# Patient Record
Sex: MALE | Race: WHITE | NOT HISPANIC OR LATINO | Employment: FULL TIME | URBAN - METROPOLITAN AREA
[De-identification: names, ages, dates, MRNs, and addresses within clinical notes are randomized per-mention and may not be internally consistent; named-entity substitution may affect disease eponyms.]

---

## 2021-12-28 ENCOUNTER — APPOINTMENT (EMERGENCY)
Dept: VASCULAR ULTRASOUND | Facility: HOSPITAL | Age: 31
End: 2021-12-28

## 2021-12-28 ENCOUNTER — HOSPITAL ENCOUNTER (EMERGENCY)
Facility: HOSPITAL | Age: 31
Discharge: HOME | End: 2021-12-28
Attending: EMERGENCY MEDICINE

## 2021-12-28 VITALS
TEMPERATURE: 98.7 F | DIASTOLIC BLOOD PRESSURE: 82 MMHG | OXYGEN SATURATION: 99 % | RESPIRATION RATE: 18 BRPM | HEART RATE: 93 BPM | SYSTOLIC BLOOD PRESSURE: 138 MMHG

## 2021-12-28 DIAGNOSIS — I82.402 ACUTE DEEP VEIN THROMBOSIS (DVT) OF LEFT LOWER EXTREMITY (HCC): Primary | ICD-10-CM

## 2021-12-28 LAB
ALBUMIN SERPL BCP-MCNC: 3.7 G/DL (ref 3.5–5)
ALP SERPL-CCNC: 89 U/L (ref 46–116)
ALT SERPL W P-5'-P-CCNC: 33 U/L (ref 12–78)
ANION GAP SERPL CALCULATED.3IONS-SCNC: 8 MMOL/L (ref 4–13)
APTT PPP: 28 SECONDS (ref 23–37)
AST SERPL W P-5'-P-CCNC: 17 U/L (ref 5–45)
BASOPHILS # BLD AUTO: 0.03 THOUSANDS/ΜL (ref 0–0.1)
BASOPHILS NFR BLD AUTO: 1 % (ref 0–1)
BILIRUB SERPL-MCNC: 0.35 MG/DL (ref 0.2–1)
BUN SERPL-MCNC: 14 MG/DL (ref 5–25)
CALCIUM SERPL-MCNC: 8.5 MG/DL (ref 8.3–10.1)
CHLORIDE SERPL-SCNC: 105 MMOL/L (ref 100–108)
CO2 SERPL-SCNC: 28 MMOL/L (ref 21–32)
CREAT SERPL-MCNC: 0.87 MG/DL (ref 0.6–1.3)
EOSINOPHIL # BLD AUTO: 0.19 THOUSAND/ΜL (ref 0–0.61)
EOSINOPHIL NFR BLD AUTO: 4 % (ref 0–6)
ERYTHROCYTE [DISTWIDTH] IN BLOOD BY AUTOMATED COUNT: 12.3 % (ref 11.6–15.1)
GFR SERPL CREATININE-BSD FRML MDRD: 116 ML/MIN/1.73SQ M
GLUCOSE SERPL-MCNC: 99 MG/DL (ref 65–140)
HCT VFR BLD AUTO: 45.8 % (ref 36.5–49.3)
HGB BLD-MCNC: 15.6 G/DL (ref 12–17)
IMM GRANULOCYTES # BLD AUTO: 0.01 THOUSAND/UL (ref 0–0.2)
IMM GRANULOCYTES NFR BLD AUTO: 0 % (ref 0–2)
INR PPP: 0.98 (ref 0.84–1.19)
LYMPHOCYTES # BLD AUTO: 2.18 THOUSANDS/ΜL (ref 0.6–4.47)
LYMPHOCYTES NFR BLD AUTO: 41 % (ref 14–44)
MCH RBC QN AUTO: 30.1 PG (ref 26.8–34.3)
MCHC RBC AUTO-ENTMCNC: 34.1 G/DL (ref 31.4–37.4)
MCV RBC AUTO: 88 FL (ref 82–98)
MONOCYTES # BLD AUTO: 0.46 THOUSAND/ΜL (ref 0.17–1.22)
MONOCYTES NFR BLD AUTO: 9 % (ref 4–12)
NEUTROPHILS # BLD AUTO: 2.45 THOUSANDS/ΜL (ref 1.85–7.62)
NEUTS SEG NFR BLD AUTO: 45 % (ref 43–75)
NRBC BLD AUTO-RTO: 0 /100 WBCS
PLATELET # BLD AUTO: 279 THOUSANDS/UL (ref 149–390)
PMV BLD AUTO: 9.7 FL (ref 8.9–12.7)
POTASSIUM SERPL-SCNC: 3.7 MMOL/L (ref 3.5–5.3)
PROT SERPL-MCNC: 8.2 G/DL (ref 6.4–8.2)
PROTHROMBIN TIME: 13 SECONDS (ref 11.6–14.5)
RBC # BLD AUTO: 5.18 MILLION/UL (ref 3.88–5.62)
SODIUM SERPL-SCNC: 141 MMOL/L (ref 136–145)
WBC # BLD AUTO: 5.32 THOUSAND/UL (ref 4.31–10.16)

## 2021-12-28 PROCEDURE — 99284 EMERGENCY DEPT VISIT MOD MDM: CPT

## 2021-12-28 PROCEDURE — 36415 COLL VENOUS BLD VENIPUNCTURE: CPT | Performed by: EMERGENCY MEDICINE

## 2021-12-28 PROCEDURE — 85610 PROTHROMBIN TIME: CPT | Performed by: EMERGENCY MEDICINE

## 2021-12-28 PROCEDURE — 80053 COMPREHEN METABOLIC PANEL: CPT | Performed by: EMERGENCY MEDICINE

## 2021-12-28 PROCEDURE — 99284 EMERGENCY DEPT VISIT MOD MDM: CPT | Performed by: EMERGENCY MEDICINE

## 2021-12-28 PROCEDURE — 85025 COMPLETE CBC W/AUTO DIFF WBC: CPT | Performed by: EMERGENCY MEDICINE

## 2021-12-28 PROCEDURE — 85730 THROMBOPLASTIN TIME PARTIAL: CPT | Performed by: EMERGENCY MEDICINE

## 2021-12-28 PROCEDURE — 93971 EXTREMITY STUDY: CPT

## 2021-12-28 RX ADMIN — APIXABAN 10 MG: 5 TABLET, FILM COATED ORAL at 17:28

## 2021-12-28 NOTE — ED PROVIDER NOTES
History  Chief Complaint   Patient presents with    Leg Pain     Pt complains of left leg pain that started a few days ago  Pt states that its lower calf area  History provided by:  Patient   used: No    Leg Pain  Associated symptoms: no fever and no neck pain      Patient is a 68-year-old male presenting to emergency department with left leg pain that started a week ago  History of blood clot  Not on blood thinners currently  Feels similar  No chest pain or shortness of breath  No trauma  No recent surgeries  No recent hospital stays  MDM will get DVT ultrasound          None       Past Medical History:   Diagnosis Date    H/O blood clots        Past Surgical History:   Procedure Laterality Date    TONSILLECTOMY         History reviewed  No pertinent family history  I have reviewed and agree with the history as documented  E-Cigarette/Vaping    E-Cigarette Use Never User      E-Cigarette/Vaping Substances     Social History     Tobacco Use    Smoking status: Never Smoker    Smokeless tobacco: Never Used   Vaping Use    Vaping Use: Never used   Substance Use Topics    Alcohol use: Never    Drug use: Never       Review of Systems   Constitutional: Negative for chills, diaphoresis and fever  HENT: Negative for congestion and sore throat  Eyes: Negative for photophobia and visual disturbance  Respiratory: Negative for cough, shortness of breath, wheezing and stridor  Cardiovascular: Negative for chest pain, palpitations and leg swelling  Gastrointestinal: Negative for abdominal pain, blood in stool, diarrhea, nausea and vomiting  Genitourinary: Negative for dysuria, frequency and urgency  Musculoskeletal: Negative for neck pain and neck stiffness  Skin: Negative for pallor and rash  Neurological: Negative for dizziness, syncope, weakness, light-headedness and headaches  All other systems reviewed and are negative        Physical Exam  Physical Exam  Vitals reviewed  Constitutional:       Appearance: Normal appearance  He is well-developed  HENT:      Head: Normocephalic and atraumatic  Eyes:      Extraocular Movements: Extraocular movements intact  Pupils: Pupils are equal, round, and reactive to light  Cardiovascular:      Rate and Rhythm: Normal rate and regular rhythm  Heart sounds: Normal heart sounds  Pulmonary:      Effort: Pulmonary effort is normal  No respiratory distress  Breath sounds: Normal breath sounds  Abdominal:      General: Bowel sounds are normal       Palpations: Abdomen is soft  Tenderness: There is no abdominal tenderness  Musculoskeletal:         General: Tenderness present  No swelling  Normal range of motion  Cervical back: Normal range of motion and neck supple  Comments: Left calf tenderness   Skin:     General: Skin is warm and dry  Capillary Refill: Capillary refill takes less than 2 seconds  Neurological:      General: No focal deficit present  Mental Status: He is alert and oriented to person, place, and time           Vital Signs  ED Triage Vitals   Temperature Pulse Respirations Blood Pressure SpO2   12/28/21 1419 12/28/21 1418 12/28/21 1418 12/28/21 1418 12/28/21 1418   98 7 °F (37 1 °C) 93 18 138/82 99 %      Temp Source Heart Rate Source Patient Position - Orthostatic VS BP Location FiO2 (%)   12/28/21 1419 12/28/21 1418 -- 12/28/21 1418 --   Oral Monitor  Left arm       Pain Score       --                  Vitals:    12/28/21 1418   BP: 138/82   Pulse: 93         Visual Acuity      ED Medications  Medications   apixaban (ELIQUIS) tablet 10 mg (10 mg Oral Given 12/28/21 1728)       Diagnostic Studies  Results Reviewed     Procedure Component Value Units Date/Time    Comprehensive metabolic panel [[id number]] Collected: 12/28/21 1617    Lab Status: Final result Specimen: Blood from Arm, Right Updated: 12/28/21 1710     Sodium 141 mmol/L      Potassium 3 7 mmol/L Chloride 105 mmol/L      CO2 28 mmol/L      ANION GAP 8 mmol/L      BUN 14 mg/dL      Creatinine 0 87 mg/dL      Glucose 99 mg/dL      Calcium 8 5 mg/dL      AST 17 U/L      ALT 33 U/L      Alkaline Phosphatase 89 U/L      Total Protein 8 2 g/dL      Albumin 3 7 g/dL      Total Bilirubin 0 35 mg/dL      eGFR 116 ml/min/1 73sq m     Narrative:      National Kidney Disease Foundation guidelines for Chronic Kidney Disease (CKD):     Stage 1 with normal or high GFR (GFR > 90 mL/min/1 73 square meters)    Stage 2 Mild CKD (GFR = 60-89 mL/min/1 73 square meters)    Stage 3A Moderate CKD (GFR = 45-59 mL/min/1 73 square meters)    Stage 3B Moderate CKD (GFR = 30-44 mL/min/1 73 square meters)    Stage 4 Severe CKD (GFR = 15-29 mL/min/1 73 square meters)    Stage 5 End Stage CKD (GFR <15 mL/min/1 73 square meters)  Note: GFR calculation is accurate only with a steady state creatinine    Protime-INR [[id number]]  (Normal) Collected: 12/28/21 1617    Lab Status: Final result Specimen: Blood from Arm, Right Updated: 12/28/21 1642     Protime 13 0 seconds      INR 0 98    APTT [[id number]]  (Normal) Collected: 12/28/21 1617    Lab Status: Final result Specimen: Blood from Arm, Right Updated: 12/28/21 1642     PTT 28 seconds     CBC and differential [[id number]] Collected: 12/28/21 1617    Lab Status: Final result Specimen: Blood from Arm, Right Updated: 12/28/21 1625     WBC 5 32 Thousand/uL      RBC 5 18 Million/uL      Hemoglobin 15 6 g/dL      Hematocrit 45 8 %      MCV 88 fL      MCH 30 1 pg      MCHC 34 1 g/dL      RDW 12 3 %      MPV 9 7 fL      Platelets 033 Thousands/uL      nRBC 0 /100 WBCs      Neutrophils Relative 45 %      Immat GRANS % 0 %      Lymphocytes Relative 41 %      Monocytes Relative 9 %      Eosinophils Relative 4 %      Basophils Relative 1 %      Neutrophils Absolute 2 45 Thousands/µL      Immature Grans Absolute 0 01 Thousand/uL      Lymphocytes Absolute 2 18 Thousands/µL      Monocytes Absolute 0 46 Thousand/µL      Eosinophils Absolute 0 19 Thousand/µL      Basophils Absolute 0 03 Thousands/µL                  VAS lower limb venous duplex study, unilateral/limited    (Results Pending)              Procedures  Procedures         ED Course  ED Course as of 12/28/21 1830   Tue Dec 28, 2021   1610 Pt with acute dvt left posterior tibial veins and soleal vein                                             MDM     Discussed with patient starting on a blood thinner  He is agreeable  Patient understands he is to follow up with outpatient, understands the risks associated with blood thinner  Disposition  Final diagnoses:   Acute deep vein thrombosis (DVT) of left lower extremity ([address] )     Time reflects when diagnosis was documented in both MDM as applicable and the Disposition within this note     Time User Action Codes Description Comment    12/28/2021  5:14 PM Romelia Ronquillo [I82 [id number]] Acute deep vein thrombosis (DVT) of left lower extremity [facility])       ED Disposition     ED Disposition Condition Date/Time Comment    Discharge Stable Tue Dec 28, 2021  5:14 PM Dia Hammond discharge to home/self care              Follow-up Information     Follow up With Specialties Details Why Contact Info Additional Information    [facility] Emergency Department Emergency Medicine  As needed, If symptoms worsen [address] [facility] [address] [facility] Emergency Department, [address], [place], [place], [address] [facility]  Family Medicine Schedule an appointment as soon as possible for a visit  Please follow-up regarding your treatment for DVT [address] [id number]  [address]  , [place], [place], [address]          Discharge Medication List as of 12/28/2021  5:16 PM      START taking these medications    Details apixaban (Eliquis) 5 mg Multiple Dosages:Starting Tue 12/28/2021, Until Mon 1/3/2022 at 2359, THEN Starting Tue 1/4/2022, Until Wed 1/26/2022 at 2359Take 2 tablets (10 mg total) by mouth 2 (two) times a day for 7 days, THEN 1 tablet (5 mg total) 2 (two) times a day for 21 da ys , Print             No discharge procedures on file      PDMP Review     None          ED Provider  Electronically Signed by           Maxine Baez MD  12/28/21 5495

## 2021-12-28 NOTE — DISCHARGE INSTRUCTIONS
Please make sure you are taking your blood thinner and follow up with family doctor  You will need treatment for 3-6 months    Return to ER if having chest pain, shortness of breath, bleeding, lightheaded or feel worse overall

## 2021-12-29 PROCEDURE — 93971 EXTREMITY STUDY: CPT | Performed by: SURGERY

## 2022-01-03 ENCOUNTER — OFFICE VISIT (OUTPATIENT)
Dept: FAMILY MEDICINE CLINIC | Facility: CLINIC | Age: 32
End: 2022-01-03

## 2022-01-03 VITALS
TEMPERATURE: 98.2 F | HEART RATE: 86 BPM | DIASTOLIC BLOOD PRESSURE: 80 MMHG | OXYGEN SATURATION: 99 % | WEIGHT: 249 LBS | SYSTOLIC BLOOD PRESSURE: 120 MMHG

## 2022-01-03 DIAGNOSIS — Z13.220 SCREENING FOR LIPID DISORDERS: ICD-10-CM

## 2022-01-03 DIAGNOSIS — Z11.4 ENCOUNTER FOR SCREENING FOR HUMAN IMMUNODEFICIENCY VIRUS (HIV): ICD-10-CM

## 2022-01-03 DIAGNOSIS — I82.442 ACUTE DEEP VEIN THROMBOSIS (DVT) OF TIBIAL VEIN OF LEFT LOWER EXTREMITY (HCC): ICD-10-CM

## 2022-01-03 DIAGNOSIS — R53.1 WEAKNESS: ICD-10-CM

## 2022-01-03 DIAGNOSIS — Z11.59 ENCOUNTER FOR HEPATITIS C SCREENING TEST FOR LOW RISK PATIENT: Primary | ICD-10-CM

## 2022-01-03 PROBLEM — Z00.00 HEALTHCARE MAINTENANCE: Status: ACTIVE | Noted: 2022-01-03

## 2022-01-03 PROCEDURE — 99203 OFFICE O/P NEW LOW 30 MIN: CPT | Performed by: FAMILY MEDICINE

## 2022-01-03 NOTE — PROGRESS NOTES
Assessment & Plan     Acute deep vein thrombosis (DVT) of tibial vein of left lower extremity ([address] )   Presented to the ED on 12/28/21 with acute left leg pain - found to have acute dvt left posterior tibial veins and soleal vein  Assessment: Still having some discomfort in the LLE, likely clot still present and resolving  Not interested in medications at this time  No known reason for provoked DVT at this time  No FHx of clots, no personal h/o cancer, no prolonged immobilization, no known trauma  Likely will need lifelong anticoagulation  No evidence of bleeding but still feels weak since DVT found  Consider anemia vs deconditioning  No neurological signs of weakness on exam     PLAN  · Continue eliquis - to start 5mg BID tomorrow  · In 6 months, obtain thrombosis panel and repeat dopplers to assess for DVT  · Obtain CBC to assess for bleeding   · Gave ED precautions - worsening leg pain, SOB      Healthcare maintenance  PLAN  · Due for annual physical, depression screening - perform at 4 weeks follow up  · Lipid panel ordered  · Discussed and ordered HIV and Hep C - patient agreed to testing  · Discuss immunization recommendations at future visit         Diagnoses and all orders for this visit:    Encounter for hepatitis C screening test for low risk patient  -     Hepatitis C antibody; Future    Encounter for screening for human immunodeficiency virus (HIV)  -     HIV 1/2 ANTIGEN/ANTIBODY (4TH GENERATION) W REFLEX [facility]; Future    Screening for lipid disorders  -     Lipid panel; Future    Acute deep vein thrombosis (DVT) of tibial vein of left lower extremity (HCC)  -     Thrombosis Panel;  Future  -     VAS lower limb venous duplex study, complete bilateral; Future    Weakness  -     CBC and differential; Future               Physical Activity Assessment and Intervention:    Activity journal reviewed      Other interventions: Does not workout    Tobacco and Toxic Substance Assessment and Intervention: Tobacco use screening performed    Alcohol and drug use screening performed        Return in about 4 weeks (around 1/31/2022) for Annual physical       History of Present Illness     Chief Complaint   Patient presents with   Linda is a 36 y o  male who presents today to [facility] care  Reports he had a blood clot recently - seen on 12/28 in the ED as he had pain in LLE for 8-9 days  Pain was insidious and gradually worsened which brought him to the ED  Same situation happened 5-6 years ago  He was hospitalized for 2-3 days  Also placed on eliquis  Unsure if he was ever worked up  Unsure when he stopped taking it  Denies family history of blood clots  Denies recent trauma  Denies any personal history of cancer  Denies easy bleeding or bruising  Denies prolonged travel  Only drives 10 minutes to and from work every day  The furthest he travels is 1 hour to see his mom  Not sedentary - works at [employer]  Family history  Denies family history of cancer  Denies family history of clots  Social history  Denies drinking or smoking  Denies IV drug use  He doesn't workout  , has 1 child - daughter, 3years old  Review of Systems     Review of Systems   Constitutional: Negative for chills and fever  HENT: Negative for congestion and rhinorrhea  Eyes: Negative for visual disturbance  Respiratory: Positive for shortness of breath  Gastrointestinal: Negative for blood in stool, diarrhea, nausea and vomiting  Genitourinary: Negative for difficulty urinating and hematuria  Musculoskeletal: Positive for myalgias (in L calf)  Neurological: Positive for weakness and headaches  Negative for light-headedness and numbness  Psychiatric/Behavioral: Negative for confusion           The following portions of the patient's history were reviewed and updated as appropriate: allergies, current medications, past family history, past medical history, past social history, past surgical history and problem list     Objective     /80   Pulse 86   Temp 98 2 °F (36 8 °C)   Wt 113 kg (249 lb)   SpO2 99%        Physical Exam  Vitals and nursing note reviewed  Constitutional:       General: He is not in acute distress  Appearance: Normal appearance  He is well-developed  He is not ill-appearing, toxic-appearing or diaphoretic  HENT:      Head: Normocephalic and atraumatic  Eyes:      General: No scleral icterus  Right eye: No discharge  Left eye: No discharge  Conjunctiva/sclera: Conjunctivae normal    Neck:      Thyroid: No thyromegaly  Cardiovascular:      Rate and Rhythm: Normal rate and regular rhythm  Heart sounds: Normal heart sounds  No murmur heard  No friction rub  No gallop  Pulmonary:      Effort: Pulmonary effort is normal  No respiratory distress  Breath sounds: Normal breath sounds  No stridor  No wheezing or rales  Abdominal:      General: Bowel sounds are normal  There is no distension  Palpations: Abdomen is soft  Tenderness: There is no abdominal tenderness  Musculoskeletal:         General: Tenderness (L calf tenderness to palpation and with dorsiflexion of the L foot) present  Cervical back: Normal range of motion and neck supple  Right lower leg: No edema  Left lower leg: No edema  Skin:     General: Skin is warm and dry  Capillary Refill: Capillary refill takes less than 2 seconds  Coloration: Skin is not pale  Neurological:      Mental Status: He is alert        Comments: 5/5 upper and lower extremity strength   Psychiatric:         Mood and Affect: Mood normal          Behavior: Behavior normal            Signature:   Florentin Dobson 162, PGY-3  01/03/22

## 2022-01-03 NOTE — LETTER
January 3, 2022     Patient: Lisa Robison   YOB: 1990   Date of Visit: 1/3/2022       To Whom it May Concern:    Lisa Robison is under my professional care  He was seen in my office on 1/3/2022  He may return to work with limitations - please limit working to one floor (minimize use of stairs), and allow for intermittent brief breaks  If you have any questions or concerns, please don't hesitate to call           Sincerely,          Tory Counter, DO        CC: No Recipients

## 2022-01-03 NOTE — ASSESSMENT & PLAN NOTE
 Presented to the ED on 12/28/21 with acute left leg pain - found to have acute dvt left posterior tibial veins and soleal vein  Assessment: Still having some discomfort in the LLE, likely clot still present and resolving  Not interested in medications at this time  No known reason for provoked DVT at this time  No FHx of clots, no personal h/o cancer, no prolonged immobilization, no known trauma  Likely will need lifelong anticoagulation  No evidence of bleeding but still feels weak since DVT found  Consider anemia vs deconditioning   No neurological signs of weakness on exam     PLAN  · Continue eliquis - to start 5mg BID tomorrow  · In 6 months, obtain thrombosis panel and repeat dopplers to assess for DVT  · Obtain CBC to assess for bleeding   · Gave ED precautions - worsening leg pain, SOB

## 2022-01-03 NOTE — ASSESSMENT & PLAN NOTE
PLAN  · Due for annual physical, depression screening - perform at 4 weeks follow up  · Lipid panel ordered  · Discussed and ordered HIV and Hep C - patient agreed to testing  · Discuss immunization recommendations at future visit

## 2022-01-03 NOTE — PATIENT INSTRUCTIONS
-Get labs prior to next visit  Please make sure you are fasting 12 hours for this test as it can alter results  It is easiest to not eat after dinner, and get labs done first thing in the morning   We are testing your cholesterol and testing you for HIV and Hep C    -In 6 months (July 2022), please get thrombosis panel and imaging of your legs to check again for clots    -Please find out if there is any family history of cancer

## 2022-01-31 ENCOUNTER — OFFICE VISIT (OUTPATIENT)
Dept: FAMILY MEDICINE CLINIC | Facility: CLINIC | Age: 32
End: 2022-01-31

## 2022-01-31 VITALS
WEIGHT: 251 LBS | TEMPERATURE: 99.9 F | OXYGEN SATURATION: 99 % | HEIGHT: 73 IN | BODY MASS INDEX: 33.27 KG/M2 | HEART RATE: 76 BPM | DIASTOLIC BLOOD PRESSURE: 84 MMHG | SYSTOLIC BLOOD PRESSURE: 120 MMHG

## 2022-01-31 DIAGNOSIS — I82.402 ACUTE DEEP VEIN THROMBOSIS (DVT) OF LEFT LOWER EXTREMITY (HCC): ICD-10-CM

## 2022-01-31 DIAGNOSIS — Z00.00 ANNUAL PHYSICAL EXAM: Primary | ICD-10-CM

## 2022-01-31 DIAGNOSIS — E66.09 CLASS 1 OBESITY DUE TO EXCESS CALORIES WITHOUT SERIOUS COMORBIDITY WITH BODY MASS INDEX (BMI) OF 33.0 TO 33.9 IN ADULT: ICD-10-CM

## 2022-01-31 PROCEDURE — 99395 PREV VISIT EST AGE 18-39: CPT | Performed by: FAMILY MEDICINE

## 2022-01-31 NOTE — PROGRESS NOTES
[address] [facility]    NAME: Nova Brannon  AGE: 36 y o  SEX: male  : 1990     DATE: 2022     Assessment and Plan:     Problem List Items Addressed This Visit        Unprioritized    Class 1 obesity due to excess calories without serious comorbidity with body mass index (BMI) of 33 0 to 33 9 in adult      Other Visit Diagnoses     Annual physical exam    -  Primary    Acute deep vein thrombosis (DVT) of left lower extremity (HCC)        Relevant Medications    apixaban (Eliquis) 5 mg          Immunizations and preventive care screenings were discussed with patient today  Appropriate education was printed on patient's after visit summary  Counseling:  Dental Health: discussed importance of regular tooth brushing, flossing, and dental visits  Sexual health: discussed sexually transmitted diseases, partner selection, use of condoms, avoidance of unintended pregnancy, and contraceptive alternatives  · Exercise: the importance of regular exercise/physical activity was discussed  Recommend exercise 3-5 times per week for at least 30 minutes  BMI Counseling: Body mass index is 33 12 kg/m²  The BMI is above normal  Nutrition recommendations include consuming healthier snacks, limiting drinks that contain sugar and reducing intake of saturated and trans fat  Exercise recommendations include moderate physical activity 150 minutes/week and exercising 3-5 times per week  Rationale for BMI follow-up plan is due to patient being overweight or obese  Refused referral to nutrition or fitness  Very stubborn, which he admits and is aware of  will try to increase fruit and vegetable intake; add 1/2 cup beans daily - but he is not sure if he is willing to make change or try to consume healthier foods  He also does not want to start working out yet because it is too cold outside and he does not want to join a gym         Depression Screening and Follow-up Plan: Patient was screened for depression during today's encounter  They screened negative with a PHQ-2 score of 0  Nutrition Assessment and Intervention:     Reviewed food recall journal      Other interventions: Handout provided on increasing fiber intake    Physical Activity Assessment and Intervention:    Activity journal reviewed      Emotional and Mental Well-being, Sleep, Connectedness Assessment and Intervention:    PHQ-9 or GAD7 performed for initial evaluation or follow-up      Tobacco and Toxic Substance Assessment and Intervention:     Tobacco use screening performed    Alcohol and drug use screening performed      Therapeutic Lifestyle Change Visit:     One-on-one comprehensive counseling, coaching, and health behavior change visit completed        Return in about 6 weeks (around 3/14/2022) for Follow up Mayo Chandra ; address lifestyle changes  Chief Complaint:     Chief Complaint   Patient presents with    Physical Exam      History of Present Illness:     Adult Annual Physical   Patient here for a comprehensive physical exam  The patient reports no problems  Diet and Physical Activity  · Diet/Nutrition: poor diet, frequent junk food and limited fruits/vegetables  · Breakfast: fruity pebble chocolate cereal with milk  · Lunch: Mac and cheese, orange, granola bar, water, soda    · Breakfast: scrambled eggs with ham  · Lunch: grilled cheese  · Dinner: rice and beans, lasagna    · Limited fast food intake  · Junk food: Ice cream bar, oatmeal cream pie, candy bars     · Exercise: no formal exercise  Depression Screening  PHQ-2/9 Depression Screening    Little interest or pleasure in doing things: 0 - not at all  Feeling down, depressed, or hopeless: 0 - not at all  PHQ-2 Score: 0  PHQ-2 Interpretation: Negative depression screen       General Health  · Sleep: sleeps well, gets 4-6 hours of sleep on average, snores loudly and witnessed apnea   Reports he was tested for LASHELL when he was [age]years old, but it was negative then  Not snoring every day  · Hearing: normal - bilateral   · Vision: vision problems: needs a new appointment, wears glasses  · Dental: regular dental visits, brushes teeth twice daily and flosses teeth occasionally  Needs to schedule     Health  · History of STDs?: no     Tobacco/alcohol/drug:  Denies tobacco, alcohol or drug use     Review of Systems:     Review of Systems   Constitutional: Negative for chills and fever  HENT: Negative for congestion and sore throat  Eyes: Positive for visual disturbance (has glasses)  Negative for pain  Respiratory: Negative for cough and shortness of breath  Cardiovascular: Negative for chest pain and leg swelling  Gastrointestinal: Negative for abdominal pain, constipation, diarrhea, nausea and vomiting  Endocrine: Negative for polydipsia and polyuria  Genitourinary: Negative for difficulty urinating and dysuria  Denies issues with erectile dysfunction     Skin: Negative for rash and wound  Neurological: Negative for light-headedness and headaches  Psychiatric/Behavioral: Positive for sleep disturbance (snores)        Past Medical History:     Past Medical History:   Diagnosis Date    H/O blood clots       Past Surgical History:     Past Surgical History:   Procedure Laterality Date    TONSILLECTOMY        Social History:     Social History     Socioeconomic History    Marital status: /Civil Union     Spouse name: Not on file    Number of children: Not on file    Years of education: Not on file    Highest education level: Not on file   Occupational History    Not on file   Tobacco Use    Smoking status: Never Smoker    Smokeless tobacco: Never Used   Vaping Use    Vaping Use: Never used   Substance and Sexual Activity    Alcohol use: Never    Drug use: Never    Sexual activity: Not on file   Other Topics Concern    Not on file   Social History Narrative    Not on file     Social Determinants of Health     Financial Resource Strain: Not on file   Food Insecurity: Not on file   Transportation Needs: Not on file   Physical Activity: Not on file   Stress: Not on file   Social Connections: Not on file   Intimate Partner Violence: Not on file   Housing Stability: Not on file      Family History:     No family history on file  Current Medications:     Current Outpatient Medications   Medication Sig Dispense Refill    apixaban (Eliquis) 5 mg Take 1 tablet (5 mg total) by mouth 2 (two) times a day 180 tablet 0     No current facility-administered medications for this visit  Allergies:     No Known Allergies   Physical Exam:     /84   Pulse 76   Temp 99 9 °F (37 7 °C)   Ht 6' 1" (1 854 m)   Wt 114 kg (251 lb)   SpO2 99%   BMI 33 12 kg/m²     Physical Exam  Vitals and nursing note reviewed  Constitutional:       General: He is not in acute distress  Appearance: Normal appearance  He is well-developed  He is not ill-appearing, toxic-appearing or diaphoretic  HENT:      Head: Normocephalic and atraumatic  Right Ear: External ear normal       Left Ear: External ear normal       Nose: Nose normal       Mouth/Throat:      Mouth: Mucous membranes are moist       Pharynx: Oropharynx is clear  No oropharyngeal exudate or posterior oropharyngeal erythema  Eyes:      General: No scleral icterus  Right eye: No discharge  Left eye: No discharge  Extraocular Movements: Extraocular movements intact  Conjunctiva/sclera: Conjunctivae normal       Pupils: Pupils are equal, round, and reactive to light  Neck:      Trachea: No tracheal deviation  Cardiovascular:      Rate and Rhythm: Normal rate and regular rhythm  Pulses: Normal pulses  Heart sounds: Normal heart sounds  No murmur heard  Comments: 2+ radial pulses bilaterally    Pulmonary:      Effort: Pulmonary effort is normal  No respiratory distress        Breath sounds: Normal breath sounds  No stridor  No wheezing or rales  Chest:      Chest wall: No tenderness  Abdominal:      General: Bowel sounds are normal  There is no distension  Palpations: Abdomen is soft  Tenderness: There is no abdominal tenderness  There is no guarding or rebound  Musculoskeletal:         General: No tenderness  Normal range of motion  Cervical back: Normal range of motion  Right lower leg: No edema  Left lower leg: No edema  Skin:     General: Skin is warm  Capillary Refill: Capillary refill takes less than 2 seconds  Neurological:      Mental Status: He is alert and oriented to person, place, and time     Psychiatric:         Mood and Affect: Mood normal          Behavior: Behavior normal           Gilda Plata DO   [facility]

## 2022-01-31 NOTE — PATIENT INSTRUCTIONS
-Eat 1/2 cup beans daily! -Try to increase fruit/vegetable intake to 3-5 servings  -Try to walk at least 10 minutes daily      Abdoul, let's talk about FIBER!! I know fiber can cause gas, bloating, cramping but fiber is such an excellent way to keep your intestines healthy, maintain regular bowel movements and reduce risks of colon diseases such as colon cancer  Many patients with obesity, hypertension, and diabetes use fiber as a way to feel full, lose weight, and keep bowel movements regular  It is VERY important you learn 2 things form reading this: 1  What foods have fiber and 2  How much fiber is actually needed in a regular healthy lifestyle  Now 25 grams or more fiber a day is the recommend amount for adults however, getting to 25gm fiber or higher must be done SLOWLY!!! Adding 5gm a fiber to your diet daily for 1-2 weeks then increase another 5gm is the BEST way to do this  If you go from 0 gms fiber to 25gm fiber in the same day, you will feel the gas, bloating, cramps and be uncomfortable  For kids they should have their age + 11  For example a 11year old needs about 10 grams/day, a 8year old needs about 15 grams/day)     Examples foods with fiber:  Raspberries (8gm) Guava (9gm) Figs (9gm)   Barley (6gm) Kiwi (4gm) Refried beans (6gm)   Beets (6gm) Chickpeas (5gm) Grapefruit (4gm)   Blackberries (8gm) Pear (6gm) Avocado (8gm)   Edamame (8gm) Black Beans (15gm) Quinoa (5gm)   Almonds (4gm) Apples (4gm) Brussel Sprouts (4gm)  Spinach      Benefiber is a great powder that dissolves in warm water, coffee, hot tea which can be added teaspoon a ta time to help add fiber to your diet, especially if some of these foods don't agree with you  You could also try Metamucil  Miralax works as both a fiber supplement and a stool softener  This can safely be used every day to prevent and treat constipation        Again, SLOWLY!!!! Add fiber slowly as you will feel side effecst of too much fiber too fast       Other examples include:  BREADS: Made with 100% whole wheat flour; valdez, wheat or rye crackers; tortillas, bran muffins   CEREALS: Whole grain cereal with bran (Chex, Raisin Bran, Corn Bran), oatmeal, rolled oats, granola, wheat flakes, brown rice   NUTS: Any nuts   FRUITS: All fresh fruits along with edible skins, (bananas, citrus fruit, mangoes, pears, prunes, raisins, apples, pineapple, apricot, melon, jams and marmalades), fruit juices (especially prune juice)   VEGETABLES: All types, preferably raw or lightly cooked: especially, celery, eggplant, potatoes,spinach, broccoli, brussel sprouts, winter squash, carrots, cauliflower, soybeans, lentils, fresh and dried beans of all kinds        Wellness Visit for Adults   AMBULATORY CARE:   A wellness visit  is when you see your healthcare provider to get screened for health problems  Your healthcare provider will also give you advice on how to stay healthy  Write down your questions so you remember to ask them  Ask your healthcare provider how often you should have a wellness visit  What happens at a wellness visit:  Your healthcare provider will ask about your health, and your family history of health problems  This includes high blood pressure, heart disease, and cancer  He or she will ask if you have symptoms that concern you, if you smoke, and about your mood  You may also be asked about your intake of medicines, supplements, food, and alcohol  Any of the following may be done:  · Your weight  will be checked  Your height may also be checked so your body mass index (BMI) can be calculated  Your BMI shows if you are at a healthy weight  · Your blood pressure  and heart rate will be checked  Your temperature may also be checked  · Blood and urine tests  may be done  Blood tests may be done to check your cholesterol levels  Abnormal cholesterol levels increase your risk for heart disease and stroke   You may also need a blood or urine test to check for diabetes if you are at increased risk  Urine tests may be done to look for signs of an infection or kidney disease  · A physical exam  includes checking your heartbeat and lungs with a stethoscope  Your healthcare provider may also check your skin to look for sun damage  · Screening tests  may be recommended  A screening test is done to check for diseases that may not cause symptoms  The screening tests you may need depend on your age, gender, family history, and lifestyle habits  For example, colorectal screening may be recommended if you are 48years old or older  Screening tests you need if you are a woman:   · A Pap smear  is used to screen for cervical cancer  Pap smears are usually done every 3 to 5 years depending on your age  You may need them more often if you have had abnormal Pap smear test results in the past  Ask your healthcare provider how often you should have a Pap smear  · A mammogram  is an x-ray of your breasts to screen for breast cancer  Experts recommend mammograms every 2 years starting at age 48 years  You may need a mammogram at age 52 years or younger if you have an increased risk for breast cancer  Talk to your healthcare provider about when you should start having mammograms and how often you need them  Vaccines you may need:   · Get an influenza vaccine  every year  The influenza vaccine protects you from the flu  Several types of viruses cause the flu  The viruses change over time, so new vaccines are made each year  · Get a tetanus-diphtheria (Td) booster vaccine  every 10 years  This vaccine protects you against tetanus and diphtheria  Tetanus is a severe infection that may cause painful muscle spasms and lockjaw  Diphtheria is a severe bacterial infection that causes a thick covering in the back of your mouth and throat  · Get a human papillomavirus (HPV) vaccine  if you are female and aged 23 to 32 or male 23 to 24 and never received it   This vaccine protects you from HPV infection  HPV is the most common infection spread by sexual contact  HPV may also cause vaginal, penile, and anal cancers  · Get a pneumococcal vaccine  if you are aged 72 years or older  The pneumococcal vaccine is an injection given to protect you from pneumococcal disease  Pneumococcal disease is an infection caused by pneumococcal bacteria  The infection may cause pneumonia, meningitis, or an ear infection  · Get a shingles vaccine  if you are 60 or older, even if you have had shingles before  The shingles vaccine is an injection to protect you from the varicella-zoster virus  This is the same virus that causes chickenpox  Shingles is a painful rash that develops in people who had chickenpox or have been exposed to the virus  How to eat healthy:  My Plate is a model for planning healthy meals  It shows the types and amounts of foods that should go on your plate  Fruits and vegetables make up about half of your plate, and grains and protein make up the other half  A serving of dairy is included on the side of your plate  The amount of calories and serving sizes you need depends on your age, gender, weight, and height  Examples of healthy foods are listed below:  · Eat a variety of vegetables  such as dark green, red, and orange vegetables  You can also include canned vegetables low in sodium (salt) and frozen vegetables without added butter or sauces  · Eat a variety of fresh fruits , canned fruit in 100% juice, frozen fruit, and dried fruit  · Include whole grains  At least half of the grains you eat should be whole grains  Examples include whole-wheat bread, wheat pasta, brown rice, and whole-grain cereals such as oatmeal     · Eat a variety of protein foods such as seafood (fish and shellfish), lean meat, and poultry without skin (turkey and chicken)   Examples of lean meats include pork leg, shoulder, or tenderloin, and beef round, sirloin, tenderloin, and extra lean ground beef  Other protein foods include eggs and egg substitutes, beans, peas, soy products, nuts, and seeds  · Choose low-fat dairy products such as skim or 1% milk or low-fat yogurt, cheese, and cottage cheese  · Limit unhealthy fats  such as butter, hard margarine, and shortening  Exercise:  Exercise at least 30 minutes per day on most days of the week  Some examples of exercise include walking, biking, dancing, and swimming  You can also fit in more physical activity by taking the stairs instead of the elevator or parking farther away from stores  Include muscle strengthening activities 2 days each week  Regular exercise provides many health benefits  It helps you manage your weight, and decreases your risk for type 2 diabetes, heart disease, stroke, and high blood pressure  Exercise can also help improve your mood  Ask your healthcare provider about the best exercise plan for you  General health and safety guidelines:   · Do not smoke  Nicotine and other chemicals in cigarettes and cigars can cause lung damage  Ask your healthcare provider for information if you currently smoke and need help to quit  E-cigarettes or smokeless tobacco still contain nicotine  Talk to your healthcare provider before you use these products  · Limit alcohol  A drink of alcohol is 12 ounces of beer, 5 ounces of wine, or 1½ ounces of liquor  · Lose weight, if needed  Being overweight increases your risk of certain health conditions  These include heart disease, high blood pressure, type 2 diabetes, and certain types of cancer  · Protect your skin  Do not sunbathe or use tanning beds  Use sunscreen with a SPF 15 or higher  Apply sunscreen at least 15 minutes before you go outside  Reapply sunscreen every 2 hours  Wear protective clothing, hats, and sunglasses when you are outside  · Drive safely  Always wear your seatbelt  Make sure everyone in your car wears a seatbelt   A seatbelt can save your life if you are in an accident  Do not use your cell phone when you are driving  This could distract you and cause an accident  Pull over if you need to make a call or send a text message  · Practice safe sex  Use latex condoms if are sexually active and have more than one partner  Your healthcare provider may recommend screening tests for sexually transmitted infections (STIs)  · Wear helmets, lifejackets, and protective gear  Always wear a helmet when you ride a bike or motorcycle, go skiing, or play sports that could cause a head injury  Wear protective equipment when you play sports  Wear a lifejacket when you are on a boat or doing water sports  © Copyright [employer] 2021 Information is for End User's use only and may not be sold, redistributed or otherwise used for commercial purposes  All illustrations and images included in CareNotes® are the copyrighted property of [employer]  or [employer]   The above information is an  only  It is not intended as medical advice for individual conditions or treatments  Talk to your doctor, nurse or pharmacist before following any medical regimen to see if it is safe and effective for you  Weight Management   AMBULATORY CARE:   Why it is important to manage your weight:  Being overweight increases your risk of health conditions such as heart disease, high blood pressure, type 2 diabetes, and certain types of cancer  It can also increase your risk for osteoarthritis, sleep apnea, and other respiratory problems  Aim for a slow, steady weight loss  Even a small amount of weight loss can lower your risk of health problems  How to lose weight safely:  A safe and healthy way to lose weight is to eat fewer calories and get regular exercise  · You can lose up about 1 pound a week by decreasing the number of calories you eat by 500 calories each day   You can decrease calories by eating smaller portion sizes or by cutting out high-calorie foods  Read labels to find out how many calories are in the foods you eat  · You can also burn calories with exercise such as walking, swimming, or biking  You will be more likely to keep weight off if you make these changes part of your lifestyle  Exercise at least 30 minutes per day on most days of the week  You can also fit in more physical activity by taking the stairs instead of the elevator or parking farther away from stores  Ask your healthcare provider about the best exercise plan for you  Healthy meal plan for weight management:  A healthy meal plan includes a variety of foods, contains fewer calories, and helps you stay healthy  A healthy meal plan includes the following:     · Eat whole-grain foods more often  A healthy meal plan should contain fiber  Fiber is the part of grains, fruits, and vegetables that is not broken down by your body  Whole-grain foods are healthy and provide extra fiber in your diet  Some examples of whole-grain foods are whole-wheat breads and pastas, oatmeal, brown rice, and bulgur  · Eat a variety of vegetables every day  Include dark, leafy greens such as spinach, kale, romelia greens, and mustard greens  Eat yellow and orange vegetables such as carrots, sweet potatoes, and winter squash  · Eat a variety of fruits every day  Choose fresh or canned fruit (canned in its own juice or light syrup) instead of juice  Fruit juice has very little or no fiber  · Eat low-fat dairy foods  Drink fat-free (skim) milk or 1% milk  Eat fat-free yogurt and low-fat cottage cheese  Try low-fat cheeses such as mozzarella and other reduced-fat cheeses  · Choose meat and other protein foods that are low in fat  Choose beans or other legumes such as split peas or lentils  Choose fish, skinless poultry (chicken or turkey), or lean cuts of red meat (beef or pork)  Before you cook meat or poultry, cut off any visible fat  · Use less fat and oil    Try baking foods instead of frying them  Add less fat, such as margarine, sour cream, regular salad dressing and mayonnaise to foods  Eat fewer high-fat foods  Some examples of high-fat foods include french fries, doughnuts, ice cream, and cakes  · Eat fewer sweets  Limit foods and drinks that are high in sugar  This includes candy, cookies, regular soda, and sweetened drinks  Ways to decrease calories:   · Eat smaller portions  ? Use a small plate with smaller servings  ? Do not eat second helpings  ? When you eat at a restaurant, ask for a box and place half of your meal in the box before you eat  ? Share an entrée with someone else  · Replace high-calorie snacks with healthy, low-calorie snacks  ? Choose fresh fruit, vegetables, fat-free rice cakes, or air-popped popcorn instead of potato chips, nuts, or chocolate  ? Choose water or calorie-free drinks instead of soda or sweetened drinks  · Do not shop for groceries when you are hungry  You may be more likely to make unhealthy food choices  Take a grocery list of healthy foods and shop after you have eaten  · Eat regular meals  Do not skip meals  Skipping meals can lead to overeating later in the day  This can make it harder for you to lose weight  Eat a healthy snack in place of a meal if you do not have time to eat a regular meal  Talk with a dietitian to help you create a meal plan and schedule that is right for you  Other things to consider as you try to lose weight:   · Be aware of situations that may give you the urge to overeat, such as eating while watching television  Find ways to avoid these situations  For example, read a book, go for a walk, or do crafts  · Meet with a weight loss support group or friends who are also trying to lose weight  This may help you stay motivated to continue working on your weight loss goals      © Copyright [employer] 2021 Information is for End User's use only and may not be sold, redistributed or otherwise used for commercial purposes  All illustrations and images included in CareNotes® are the copyrighted property of [employer] M [employer] Inc  or Kassie Campos  The above information is an  only  It is not intended as medical advice for individual conditions or treatments  Talk to your doctor, nurse or pharmacist before following any medical regimen to see if it is safe and effective for you

## 2022-02-01 PROBLEM — E66.811 CLASS 1 OBESITY DUE TO EXCESS CALORIES WITHOUT SERIOUS COMORBIDITY WITH BODY MASS INDEX (BMI) OF 33.0 TO 33.9 IN ADULT: Status: ACTIVE | Noted: 2022-02-01

## 2022-02-01 PROBLEM — E66.09 CLASS 1 OBESITY DUE TO EXCESS CALORIES WITHOUT SERIOUS COMORBIDITY WITH BODY MASS INDEX (BMI) OF 33.0 TO 33.9 IN ADULT: Status: ACTIVE | Noted: 2022-02-01

## 2022-03-17 ENCOUNTER — OFFICE VISIT (OUTPATIENT)
Dept: FAMILY MEDICINE CLINIC | Facility: CLINIC | Age: 32
End: 2022-03-17
Payer: COMMERCIAL

## 2022-03-17 VITALS
OXYGEN SATURATION: 99 % | DIASTOLIC BLOOD PRESSURE: 80 MMHG | HEART RATE: 98 BPM | TEMPERATURE: 99.5 F | WEIGHT: 259.6 LBS | RESPIRATION RATE: 20 BRPM | BODY MASS INDEX: 34.25 KG/M2 | SYSTOLIC BLOOD PRESSURE: 122 MMHG

## 2022-03-17 DIAGNOSIS — Z86.718 HISTORY OF DVT (DEEP VEIN THROMBOSIS): ICD-10-CM

## 2022-03-17 PROCEDURE — 99213 OFFICE O/P EST LOW 20 MIN: CPT | Performed by: FAMILY MEDICINE

## 2022-03-17 NOTE — PROGRESS NOTES
Assessment & Plan     History of DVT (deep vein thrombosis)   Presented to the ED on 12/28/21 with acute left leg pain - found to have acute dvt left posterior tibial veins and soleal vein    Assessment: No symptoms of DVT  No known reason for provoked DVT at this time  His sister has a h/o DVT, but he doesn't know the workup  He denies personal h/o cancer, no prolonged immobilization, no known trauma  Likely will need lifelong anticoagulation  PLAN  · Continue eliquis 5mg BID  · In July 2022 - obtain thrombosis panel and repeat dopplers to assess for DVT  · Gave ED precautions - worsening leg pain, SOB             Diagnoses and all orders for this visit:    History of DVT (deep vein thrombosis)  -     apixaban (Eliquis) 5 mg; Take 1 tablet (5 mg total) by mouth 2 (two) times a day           Nutrition Assessment and Intervention:     Reviewed food recall journal      Physical Activity Assessment and Intervention:    Activity journal reviewed          Return in about 3 months (around 7/1/2022) for Review thrombosis panel  History of Present Illness     Chief Complaint   Patient presents with   Naresh Reina is a 29 y o  male who presents today for follow up on labs and medication refills  For the past month has only been taking 1 eliquis per day, as otherwise he would have ran out  Needs refills  Denies any signs/symptoms of DVT  Can play video games for up to 3 hours without getting up  That's the longest time he'll sit without moving  Has a [employer] job so he moves around a lot  Thrombosis panel and DVT imaging to be done in July 2022  Still not interested in getting vaccines  "I just don't want to get it"  Thinking about it "maybe down the line"  Labs reviewed and wnl  Lipid panel, CBC normal  HIV and Hep C negative  He does eat processed fats still, which would explain slightly elevated LDL  He is currently not active, and is in the planning phase of change  He has to set up home gym, but has not done so yet  Review of Systems     Review of Systems      The following portions of the patient's history were reviewed and updated as appropriate: allergies, current medications, past family history, past medical history, past social history, past surgical history and problem list     Objective     /80 (BP Location: Right arm, Patient Position: Sitting, Cuff Size: Large)   Pulse 98   Temp 99 5 °F (37 5 °C)   Resp 20   Wt 118 kg (259 lb 9 6 oz)   SpO2 99%   BMI 34 25 kg/m²        Physical Exam  Vitals and nursing note reviewed  Constitutional:       General: He is not in acute distress  Appearance: Normal appearance  He is well-developed  He is not ill-appearing, toxic-appearing or diaphoretic  HENT:      Head: Normocephalic and atraumatic  Right Ear: External ear normal       Left Ear: External ear normal       Nose: Nose normal       Mouth/Throat:      Mouth: Mucous membranes are moist       Pharynx: Oropharynx is clear  No oropharyngeal exudate or posterior oropharyngeal erythema  Eyes:      General: No scleral icterus  Right eye: No discharge  Left eye: No discharge  Conjunctiva/sclera: Conjunctivae normal    Neck:      Thyroid: No thyromegaly  Cardiovascular:      Rate and Rhythm: Normal rate and regular rhythm  Heart sounds: Normal heart sounds  No murmur heard  No friction rub  No gallop  Pulmonary:      Effort: Pulmonary effort is normal  No respiratory distress  Breath sounds: Normal breath sounds  No stridor  No wheezing or rales  Musculoskeletal:      Right lower leg: No edema  Left lower leg: No edema  Skin:     General: Skin is warm and dry  Coloration: Skin is not pale  Neurological:      Mental Status: He is alert     Psychiatric:         Mood and Affect: Mood normal          Behavior: Behavior normal            Signature:   Phil Leyva[address]  David Gunderson, PGY-3  03/17/22

## 2022-03-17 NOTE — PATIENT INSTRUCTIONS
Please call your sister who had a blood clot- ask if the doctors did any workup and if they know why she had one  Please get thrombosis panel in July

## 2022-03-17 NOTE — ASSESSMENT & PLAN NOTE
 Presented to the ED on 12/28/21 with acute left leg pain - found to have acute dvt left posterior tibial veins and soleal vein    Assessment: No symptoms of DVT  No known reason for provoked DVT at this time  His sister has a h/o DVT, but he doesn't know the workup  He denies personal h/o cancer, no prolonged immobilization, no known trauma  Likely will need lifelong anticoagulation      PLAN  · Continue eliquis 5mg BID  · In July 2022 - obtain thrombosis panel and repeat dopplers to assess for DVT  · Gave ED precautions - worsening leg pain, SOB

## 2022-06-16 ENCOUNTER — TELEPHONE (OUTPATIENT)
Dept: FAMILY MEDICINE CLINIC | Facility: CLINIC | Age: 32
End: 2022-06-16

## 2022-07-29 DIAGNOSIS — Z86.718 HISTORY OF DVT (DEEP VEIN THROMBOSIS): ICD-10-CM

## 2022-08-01 DIAGNOSIS — Z86.718 HISTORY OF DVT (DEEP VEIN THROMBOSIS): ICD-10-CM

## 2022-08-01 RX ORDER — APIXABAN 5 MG/1
TABLET, FILM COATED ORAL
Qty: 180 TABLET | Refills: 0 | Status: SHIPPED | OUTPATIENT
Start: 2022-08-01 | End: 2022-08-02 | Stop reason: SDUPTHER

## 2022-08-01 NOTE — TELEPHONE ENCOUNTER
Patient requests refill - he has no pills left  Please advise thank you   Pt is scheduled for [place] with dr Tonja Vann end of august

## 2022-08-02 DIAGNOSIS — Z86.718 HISTORY OF DVT (DEEP VEIN THROMBOSIS): ICD-10-CM

## 2022-08-19 ENCOUNTER — HOSPITAL ENCOUNTER (OUTPATIENT)
Dept: NON INVASIVE DIAGNOSTICS | Facility: CLINIC | Age: 32
Discharge: HOME | End: 2022-08-19
Payer: COMMERCIAL

## 2022-08-19 DIAGNOSIS — I82.442 ACUTE DEEP VEIN THROMBOSIS (DVT) OF TIBIAL VEIN OF LEFT LOWER EXTREMITY (HCC): ICD-10-CM

## 2022-08-19 PROCEDURE — 93970 EXTREMITY STUDY: CPT | Performed by: SURGERY

## 2022-08-19 PROCEDURE — 93970 EXTREMITY STUDY: CPT

## 2022-08-22 NOTE — RESULT ENCOUNTER NOTE
Bilateral lower extremities venous duplex dopplers on 08/19/22 show no acute or chronic DVTs present in deep and superficial veins bilaterally  Results show there is a resolution of the DVT from the study in 12/28/21  Plan to discuss these results with the patient at the next visit with me on 08/24/22  Thank you

## 2022-08-24 ENCOUNTER — OFFICE VISIT (OUTPATIENT)
Dept: FAMILY MEDICINE CLINIC | Facility: CLINIC | Age: 32
End: 2022-08-24
Payer: COMMERCIAL

## 2022-08-24 VITALS
OXYGEN SATURATION: 99 % | HEART RATE: 117 BPM | SYSTOLIC BLOOD PRESSURE: 130 MMHG | DIASTOLIC BLOOD PRESSURE: 80 MMHG | WEIGHT: 262 LBS | BODY MASS INDEX: 34.72 KG/M2 | TEMPERATURE: 98.1 F | HEIGHT: 73 IN

## 2022-08-24 DIAGNOSIS — Z86.718 HISTORY OF DVT (DEEP VEIN THROMBOSIS): ICD-10-CM

## 2022-08-24 DIAGNOSIS — I82.442 ACUTE DEEP VEIN THROMBOSIS (DVT) OF TIBIAL VEIN OF LEFT LOWER EXTREMITY (HCC): Primary | ICD-10-CM

## 2022-08-24 PROCEDURE — 99214 OFFICE O/P EST MOD 30 MIN: CPT

## 2022-08-24 NOTE — PATIENT INSTRUCTIONS
Stop Eliquis     In 1 month get thrombosis panel blood work done (by 9/11/22)    Take Aspirin 325 mg  if taking extended car rides for day of travel

## 2022-08-24 NOTE — PROGRESS NOTES
Assessment/Plan:    Acute deep vein thrombosis (DVT) of tibial vein of left lower extremity ([address] )   Presented to the ED on 12/28/21 with acute left leg pain - found to have acute dvt left posterior tibial veins and soleal vein    Assessment: No symptoms of DVT  No known reason for provoked DVT at this time  His sister has a h/o DVT, but he doesn't know the workup  He denies personal h/o cancer, no prolonged immobilization, no known trauma  B/L LE duplex doppler ultrasound showed resolution of DVTs  Patient has not completed thrombosis panel  PLAN  · Discontinued eliquis due to completion of 8 months of anticoagulation therapy and bilateral duplex dopplers show resolution of DVTs  · After 2 weeks of stopping the anticoagulation, patient will get thrombosis panel workup   · Recommend taking Aspirin 325 mg for extended car rides/air travel/ or travel  · Gave ED precautions - worsening leg pain, SOB         Diagnoses and all orders for this visit:    Acute deep vein thrombosis (DVT) of tibial vein of left lower extremity (HCC)  -     Thrombosis Panel; Future    History of DVT (deep vein thrombosis)  -     Thrombosis Panel; Future        Follow-up: Will call patient about thrombosis panel results  Next appointment is annual physical exam due around February     Subjective:      Patient ID: Thuy Zavala is a 29 y o  male with pmhx of unprovoked DVTs presents for follow up for bilateral LE doppler ultrasound results  HPI   He presented to the ED on 12/28/21 with left lower leg pain and was found to have DVTs in his left posterior tibial and soleal veins  Denies any specific trauma, surgery, immobilization, or history of cancer to provoke the DVT  He reports this is his second unprovoked DVT  His first one was in 2016 and it was unprovoked with no history of cancer, surgery, trauma, immobilization  Denies any long travel history  He has been taking Eliquis 5 mg BID for 8 months since 12/28/21   Familial history includes a sister with 1x DVT, but he is unsure about her work-up  Currently denies any leg pain, SOB, chest pain, chest palpations, swelling, or fevers  The following portions of the patient's history were reviewed and updated as appropriate: allergies, current medications, past family history, past medical history, past social history, past surgical history and problem list     Review of Systems   Constitutional: Negative for chills and fever  HENT: Negative for ear pain, rhinorrhea and sore throat  Eyes: Negative for pain and visual disturbance  Respiratory: Negative for cough and shortness of breath  Cardiovascular: Negative for chest pain and palpitations  Gastrointestinal: Negative for abdominal pain, blood in stool, constipation, diarrhea, nausea and vomiting  Genitourinary: Negative for dysuria and hematuria  Musculoskeletal: Negative for arthralgias and back pain  Skin: Negative for color change and rash  All other systems reviewed and are negative  Objective:      /80   Pulse (!) 117   Temp 98 1 °F (36 7 °C)   Ht 6' 1" (1 854 m)   Wt 119 kg (262 lb)   SpO2 99%   BMI 34 57 kg/m²          Physical Exam  Vitals and nursing note reviewed  Constitutional:       Appearance: Normal appearance  Comments: Body mass index is 34 57 kg/m²  HENT:      Head: Normocephalic  Cardiovascular:      Rate and Rhythm: Normal rate and regular rhythm  Pulses: Normal pulses  Radial pulses are 2+ on the right side and 2+ on the left side  Posterior tibial pulses are 2+ on the right side and 2+ on the left side  Heart sounds: Normal heart sounds  No murmur heard  No gallop  Pulmonary:      Effort: Pulmonary effort is normal  No respiratory distress  Breath sounds: Normal breath sounds  No wheezing, rhonchi or rales  Musculoskeletal:         General: No swelling or tenderness  Right lower leg: No edema        Left lower leg: No edema  Comments: No lower extremity swelling or tenderness  Symmetrical leg sizes  Skin:     General: Skin is warm and dry  Neurological:      Mental Status: He is alert  Psychiatric:         Mood and Affect: Mood normal          Behavior: Behavior normal            CONCLUSION:     Bilateral LE Vascular Venous doppler duplex Impression (08/19/22):  RIGHT LOWER LIMB:  No evidence of acute or chronic deep vein thrombosis  No evidence of superficial thrombophlebitis noted  Doppler evaluation shows a normal response to augmentation maneuvers  Popliteal, posterior tibial and anterior tibial arterial Doppler waveforms are  triphasic  LEFT LOWER LIMB:  No evidence of acute or chronic deep vein thrombosis  No evidence of superficial thrombophlebitis noted  Doppler evaluation shows a normal response to augmentation maneuvers  Popliteal, posterior tibial and anterior tibial arterial Doppler waveforms are  triphasic  In comparison to the study of 12/28/2021, there is resolution of DVT      Carmina Alvarenga DO  Family Medicine Resident PGY-1

## 2022-08-24 NOTE — ASSESSMENT & PLAN NOTE
 Presented to the ED on 12/28/21 with acute left leg pain - found to have acute dvt left posterior tibial veins and soleal vein    Assessment: No symptoms of DVT  No known reason for provoked DVT at this time  His sister has a h/o DVT, but he doesn't know the workup  He denies personal h/o cancer, no prolonged immobilization, no known trauma  B/L LE duplex doppler ultrasound showed resolution of DVTs  Patient has not completed thrombosis panel  PLAN  · Discontinued eliquis due to completion of 8 months of anticoagulation therapy and bilateral duplex dopplers show resolution of DVTs  · After 2 weeks of stopping the anticoagulation, patient will get thrombosis panel workup   · Recommend taking Aspirin 325 mg for extended car rides/air travel/ or travel     · Gave ED precautions - worsening leg pain, SOB

## 2022-09-28 ENCOUNTER — TELEPHONE (OUTPATIENT)
Dept: OTHER | Facility: OTHER | Age: 32
End: 2022-09-28

## 2022-09-28 NOTE — TELEPHONE ENCOUNTER
Patient tested positive for Covid, and was out of work 09-09-22 through 09-11-22  He will need a letter to return for work  Please contact Ludmila with any questions

## 2022-09-29 ENCOUNTER — OFFICE VISIT (OUTPATIENT)
Dept: FAMILY MEDICINE CLINIC | Facility: CLINIC | Age: 32
End: 2022-09-29
Payer: COMMERCIAL

## 2022-09-29 VITALS
BODY MASS INDEX: 35.09 KG/M2 | HEIGHT: 73 IN | HEART RATE: 113 BPM | SYSTOLIC BLOOD PRESSURE: 120 MMHG | DIASTOLIC BLOOD PRESSURE: 60 MMHG | TEMPERATURE: 97.2 F | OXYGEN SATURATION: 98 % | WEIGHT: 264.8 LBS

## 2022-09-29 DIAGNOSIS — U07.1 COVID-19: Primary | ICD-10-CM

## 2022-09-29 PROCEDURE — 99213 OFFICE O/P EST LOW 20 MIN: CPT | Performed by: FAMILY MEDICINE

## 2022-10-02 PROBLEM — U07.1 COVID-19: Status: ACTIVE | Noted: 2022-10-02

## 2022-10-02 NOTE — PROGRESS NOTES
Name: David Hooker      : 1990      MRN: [id number]  Encounter Provider: Olga Moreland DO  Encounter Date: 2022   Encounter department: [facility]    Assessment & Plan     1  COVID-19  Assessment & Plan:  · Patient has history of COVID-19 infection for which he was positive around    Patient notes he mild illness but needed to be off work  · Daughter was then positive a few days later and needed to be home as well  · Upon her family has resolved from the COVID infection and is doing well without any residual side effects  · Patient presents needing FMLA documentation  · Paperwork completed and returned  · No fevers, chills, nausea, vomiting, diarrhea  Patient notes no shortness of breath, cough chest pain  He is tolerating p o  intake without any concern denies any brain fog  · Follow up as previous scheduled  Subjective      Abdoul is a 62-year-old male presents today for follow-up after recent COVID-19 infection  Patient works [employer] and he to be over 10 days off as after he is improved from his ears his daughter also became positive and in to be home  Patient notes he was positive for COVID-19 around    Relates his symptoms resolved within a week  He denies any further fevers, chills, nausea, vomiting, diarrhea  He is tolerating all oral intake without concern  He denies any chest pain, shortness a breath, cough  He denies any brain fog  Patient presents today needing FMLA paperwork completed  No further concerns today  Review of Systems   Constitutional: Negative for chills, fatigue, fever and unexpected weight change  HENT: Negative for congestion, ear pain, postnasal drip, rhinorrhea, sinus pressure, sinus pain, sore throat and tinnitus  Eyes: Negative for pain, redness and visual disturbance  Respiratory: Negative for cough, shortness of breath and wheezing      Cardiovascular: Negative for chest pain and palpitations  Gastrointestinal: Negative for abdominal pain, blood in stool, constipation, diarrhea, nausea and vomiting  Endocrine: Negative for polydipsia and polyuria  Genitourinary: Negative for dysuria and hematuria  Musculoskeletal: Negative for arthralgias, back pain and neck pain  Skin: Negative for color change and rash  Neurological: Negative for dizziness, seizures, syncope, weakness and headaches  Psychiatric/Behavioral: Negative for confusion and sleep disturbance  The patient is not nervous/anxious  All other systems reviewed and are negative  No current outpatient medications on file prior to visit  Objective     /60   Pulse (!) 113   Temp (!) 97 2 °F (36 2 °C)   Ht 6' 1" (1 854 m)   Wt 120 kg (264 lb 12 8 oz)   SpO2 98%   BMI 34 94 kg/m²     Physical Exam  Vitals and nursing note reviewed  Constitutional:       General: He is not in acute distress  Appearance: Normal appearance  He is not ill-appearing  HENT:      Head: Normocephalic and atraumatic  Right Ear: Tympanic membrane and external ear normal       Left Ear: Tympanic membrane and external ear normal       Nose: Nose normal  No congestion  Mouth/Throat:      Mouth: Mucous membranes are moist       Pharynx: No oropharyngeal exudate  Eyes:      Extraocular Movements: Extraocular movements intact  Conjunctiva/sclera: Conjunctivae normal       Pupils: Pupils are equal, round, and reactive to light  Cardiovascular:      Rate and Rhythm: Normal rate and regular rhythm  Pulses: Normal pulses  Heart sounds: Normal heart sounds  No murmur heard  Pulmonary:      Effort: Pulmonary effort is normal       Breath sounds: Normal breath sounds  No wheezing, rhonchi or rales  Abdominal:      General: Bowel sounds are normal       Palpations: Abdomen is soft  Tenderness: There is no abdominal tenderness  There is no guarding or rebound     Musculoskeletal: General: Normal range of motion  Cervical back: Normal range of motion  Right lower leg: No edema  Left lower leg: No edema  Lymphadenopathy:      Cervical: No cervical adenopathy  Skin:     General: Skin is warm  Capillary Refill: Capillary refill takes less than 2 seconds  Findings: No erythema  Neurological:      General: No focal deficit present  Mental Status: He is alert and oriented to person, place, and time  Cranial Nerves: No cranial nerve deficit  Motor: No weakness     Psychiatric:         Mood and Affect: Mood normal          Behavior: Behavior normal        Viviane Goodwin DO

## 2022-10-02 NOTE — ASSESSMENT & PLAN NOTE
· Patient has history of COVID-19 infection for which he was positive around September 1st   Patient notes he mild illness but needed to be off work  · Daughter was then positive a few days later and needed to be home as well  · Upon her family has resolved from the COVID infection and is doing well without any residual side effects  · Patient presents needing FMLA documentation  · Paperwork completed and returned  · No fevers, chills, nausea, vomiting, diarrhea  Patient notes no shortness of breath, cough chest pain  He is tolerating p o  intake without any concern denies any brain fog  · Follow up as previous scheduled

## 2022-10-12 PROBLEM — Z00.00 HEALTHCARE MAINTENANCE: Status: RESOLVED | Noted: 2022-01-03 | Resolved: 2022-10-12

## 2022-10-14 ENCOUNTER — HOSPITAL ENCOUNTER (EMERGENCY)
Facility: HOSPITAL | Age: 32
Discharge: HOME | End: 2022-10-14
Attending: EMERGENCY MEDICINE
Payer: COMMERCIAL

## 2022-10-14 ENCOUNTER — APPOINTMENT (EMERGENCY)
Dept: VASCULAR ULTRASOUND | Facility: HOSPITAL | Age: 32
End: 2022-10-14
Payer: COMMERCIAL

## 2022-10-14 VITALS
HEART RATE: 98 BPM | DIASTOLIC BLOOD PRESSURE: 88 MMHG | RESPIRATION RATE: 18 BRPM | SYSTOLIC BLOOD PRESSURE: 163 MMHG | OXYGEN SATURATION: 98 % | TEMPERATURE: 98.4 F

## 2022-10-14 DIAGNOSIS — I82.442 ACUTE DEEP VEIN THROMBOSIS (DVT) OF TIBIAL VEIN OF LEFT LOWER EXTREMITY (HCC): ICD-10-CM

## 2022-10-14 LAB
ANION GAP SERPL CALCULATED.3IONS-SCNC: 6 MMOL/L (ref 4–13)
BUN SERPL-MCNC: 14 MG/DL (ref 5–25)
CALCIUM SERPL-MCNC: 8.9 MG/DL (ref 8.4–10.2)
CHLORIDE SERPL-SCNC: 106 MMOL/L (ref 96–108)
CO2 SERPL-SCNC: 28 MMOL/L (ref 21–32)
CREAT SERPL-MCNC: 0.92 MG/DL (ref 0.6–1.3)
GFR SERPL CREATININE-BSD FRML MDRD: 111 ML/MIN/1.73SQ M
GLUCOSE SERPL-MCNC: 95 MG/DL (ref 65–140)
POTASSIUM SERPL-SCNC: 3.6 MMOL/L (ref 3.5–5.3)
SODIUM SERPL-SCNC: 140 MMOL/L (ref 135–147)

## 2022-10-14 PROCEDURE — 85670 THROMBIN TIME PLASMA: CPT

## 2022-10-14 PROCEDURE — 86147 CARDIOLIPIN ANTIBODY EA IG: CPT

## 2022-10-14 PROCEDURE — 85306 CLOT INHIBIT PROT S FREE: CPT

## 2022-10-14 PROCEDURE — 85305 CLOT INHIBIT PROT S TOTAL: CPT

## 2022-10-14 PROCEDURE — 93971 EXTREMITY STUDY: CPT

## 2022-10-14 PROCEDURE — 85613 RUSSELL VIPER VENOM DILUTED: CPT

## 2022-10-14 PROCEDURE — 85303 CLOT INHIBIT PROT C ACTIVITY: CPT

## 2022-10-14 PROCEDURE — 85732 THROMBOPLASTIN TIME PARTIAL: CPT

## 2022-10-14 PROCEDURE — 99284 EMERGENCY DEPT VISIT MOD MDM: CPT

## 2022-10-14 PROCEDURE — 81240 F2 GENE: CPT

## 2022-10-14 PROCEDURE — 36415 COLL VENOUS BLD VENIPUNCTURE: CPT

## 2022-10-14 PROCEDURE — 86146 BETA-2 GLYCOPROTEIN ANTIBODY: CPT

## 2022-10-14 PROCEDURE — 85705 THROMBOPLASTIN INHIBITION: CPT

## 2022-10-14 PROCEDURE — 80048 BASIC METABOLIC PNL TOTAL CA: CPT

## 2022-10-14 PROCEDURE — 81241 F5 GENE: CPT

## 2022-10-14 PROCEDURE — 85300 ANTITHROMBIN III ACTIVITY: CPT

## 2022-10-14 RX ADMIN — APIXABAN 10 MG: 5 TABLET, FILM COATED ORAL at 20:56

## 2022-10-14 NOTE — ED PROVIDER NOTES
History  Chief Complaint   Patient presents with   • Leg Pain     Pt c/o L calf pain onset of pain yesterday, hx of blood clots, pt ambulatory in triage; denies CP/SOB     Patient is a 53-year-old male with a past medical history of DVT in left leg  Patient reports that on Thursday he started feeling some pain in his left calf similar to prior DVT  Patient reports that he was taken off Eliquis 3 months ago  Patient reports that is not currently taking any anticoagulation  Patient reports that he was under blood work for possible clotting disorders, but insurance did not approve his testing  Patient reports that he had no long recent trips  Patient denies recent injury to left lower leg  Patient reports that he feels his left leg is more full, than his right  None       Past Medical History:   Diagnosis Date   • H/O blood clots     Had one in 2016, then another in 2021  Past Surgical History:   Procedure Laterality Date   • TONSILLECTOMY         Family History   Problem Relation Age of Onset   • Hypertension Mother    • Diabetes type II Mother    • No Known Problems Father         Dad not in the picture   • Deep vein thrombosis Sister    • No Known Problems Sister    • No Known Problems Sister    • No Known Problems Brother    • No Known Problems Daughter      I have reviewed and agree with the history as documented  E-Cigarette/Vaping   • E-Cigarette Use Never User      E-Cigarette/Vaping Substances     Social History     Tobacco Use   • Smoking status: Never Smoker   • Smokeless tobacco: Never Used   Vaping Use   • Vaping Use: Never used   Substance Use Topics   • Alcohol use: Never   • Drug use: Never       Review of Systems   Constitutional: Negative  HENT: Negative  Eyes: Negative  Respiratory: Negative  Negative for shortness of breath  Cardiovascular: Positive for leg swelling  Negative for chest pain  Gastrointestinal: Negative  Endocrine: Negative      Genitourinary: Negative  Musculoskeletal: Negative  Skin: Negative  Allergic/Immunologic: Negative  Neurological: Negative  Hematological: Negative  Psychiatric/Behavioral: Negative  All other systems reviewed and are negative  Physical Exam  Physical Exam  Vitals and nursing note reviewed  Constitutional:       General: He is not in acute distress  Appearance: Normal appearance  He is normal weight  He is not ill-appearing or toxic-appearing  HENT:      Right Ear: External ear normal       Left Ear: External ear normal       Nose: Nose normal       Mouth/Throat:      Mouth: Mucous membranes are moist    Eyes:      Extraocular Movements: Extraocular movements intact  Pupils: Pupils are equal, round, and reactive to light  Cardiovascular:      Rate and Rhythm: Normal rate and regular rhythm  Pulses: Normal pulses  Posterior tibial pulses are 2+ on the right side and 2+ on the left side  Heart sounds: Normal heart sounds  Pulmonary:      Effort: Pulmonary effort is normal       Breath sounds: Normal breath sounds  Abdominal:      General: Abdomen is flat  Musculoskeletal:         General: Swelling present  Cervical back: Normal range of motion  Right lower leg: No swelling  No edema  Left lower leg: Swelling and tenderness present  No edema  Comments: Slight swelling to left lower leg, no erythema  Reports left calf pain similar to previous DVT   Skin:     General: Skin is warm  Capillary Refill: Capillary refill takes less than 2 seconds  Neurological:      General: No focal deficit present  Mental Status: He is alert and oriented to person, place, and time  Mental status is at baseline  Cranial Nerves: No cranial nerve deficit  Motor: No weakness     Psychiatric:         Mood and Affect: Mood normal          Vital Signs  ED Triage Vitals [10/14/22 1906]   Temperature Pulse Respirations Blood Pressure SpO2   98 4 °F (36 9 °C) 98 18 163/88 98 %      Temp Source Heart Rate Source Patient Position - Orthostatic VS BP Location FiO2 (%)   Oral Monitor Sitting Right arm --      Pain Score       --           Vitals:    10/14/22 1906   BP: 163/88   Pulse: 98   Patient Position - Orthostatic VS: Sitting         Visual Acuity      ED Medications  Medications   apixaban (ELIQUIS) tablet 10 mg (10 mg Oral Given 10/14/22 2056)       Diagnostic Studies  Results Reviewed     Procedure Component Value Units Date/Time    Protein S activity [[id number]]  (Normal) Collected: 10/14/22 2052    Lab Status: Final result Specimen: Blood from Arm, Left Updated: 10/17/22 0822     PROTEIN S ACTIVITY 101 %     Narrative:      Lupus Anticoagulant and/or Anti-Phospholipid Antibodies May Cause Interference  Thrombin Inhibitors May Lead to an Over-Estimation of Protein S  Protein C activity [[id number]]  (Normal) Collected: 10/14/22 2052    Lab Status: Final result Specimen: Blood from Arm, Left Updated: 10/17/22 0821     Protein C Activity 85 0 % of Normal     Antithrombin III Activity [[id number]]  (Abnormal) Collected: 10/14/22 2052    Lab Status: Final result Specimen: Blood from Arm, Left Updated: 10/15/22 0204     AntiThrombIN III Activity 84 % of Normal     Protein S Antigen, Total & Free [[id number]] Collected: 10/14/22 2052    Lab Status: In process Specimen: Blood from Arm, Left Updated: 10/14/22 2059    Lupus anticoagulant [[id number]] Collected: 10/14/22 2052    Lab Status: In process Specimen: Blood from Arm, Left Updated: 10/14/22 2059    Factor 5 leiden [[id number]] Collected: 10/14/22 2052    Lab Status: In process Specimen: Blood from Arm, Left Updated: 10/14/22 2058    Prothrombin gene mutation [[id number]] Collected: 10/14/22 2052    Lab Status: In process Specimen: Blood from Arm, Left Updated: 10/14/22 2058    Cardiolipin antibody [[id number]] Collected: 10/14/22 2052    Lab Status:  In process Specimen: Blood from Arm, Left Updated: 10/14/22 2058 Beta-2 glycoprotein antibodies [[id number]] Collected: 10/14/22 2052    Lab Status: In process Specimen: Blood from Arm, Left Updated: 10/14/22 3817    Basic metabolic panel [[id number]] Collected: 10/14/22 2026    Lab Status: Final result Specimen: Blood Updated: 10/14/22 2049     Sodium 140 mmol/L      Potassium 3 6 mmol/L      Chloride 106 mmol/L      CO2 28 mmol/L      ANION GAP 6 mmol/L      BUN 14 mg/dL      Creatinine 0 92 mg/dL      Glucose 95 mg/dL      Calcium 8 9 mg/dL      eGFR 111 ml/min/1 73sq m     Narrative:      [place] guidelines for Chronic Kidney Disease (CKD):   •  Stage 1 with normal or high GFR (GFR > 90 mL/min/1 73 square meters)  •  Stage 2 Mild CKD (GFR = 60-89 mL/min/1 73 square meters)  •  Stage 3A Moderate CKD (GFR = 45-59 mL/min/1 73 square meters)  •  Stage 3B Moderate CKD (GFR = 30-44 mL/min/1 73 square meters)  •  Stage 4 Severe CKD (GFR = 15-29 mL/min/1 73 square meters)  •  Stage 5 End Stage CKD (GFR <15 mL/min/1 73 square meters)  Note: GFR calculation is accurate only with a steady state creatinine                 VAS lower limb venous duplex study, unilateral/limited   Final Result by Los Rosado MD (10/15 2230)                 Procedures  Procedures         ED Course  ED Course as of 10/17/22 1427   Fri Oct 14, 2022   2010  Received the following results from vascular tech "kind of hard to tell I think it's a chronic thrombus in one of the left posterior tibial veins that was seen on a study in 2021 but it wasn't seen on his most recent study but I think it's just because it's so small and only in the proximal calf  So I will probably call it subacute vs chronic DVT in one of the paired posterior tibial veins at the proximal calf"    2034 Discussed vascular study results  Discussed at length with patient that he needs to follow-up  Discussed risk benefits of receiving Eliquis, verses waiting for a repeat scan in 1 week    Patient reports that he would like to resume his Eliquis therapy  SBIRT 22yo+    Flowsheet Row Most Recent Value   SBIRT (25 yo +)    In order to provide better care to our patients, we are screening all of our patients for alcohol and drug use  Would it be okay to ask you these screening questions? Unable to answer at this time Filed at: 10/14/2022 1907                    MDM  Number of Diagnoses or Management Options  Acute deep vein thrombosis (DVT) of tibial vein of left lower extremity ([address] )  Diagnosis management comments: DDx: muscle strain, DVT  Pt has no chest pain or shortness of breath  Will obtain vascular study as pt reports was taken off his AC three months ago for DVT in this specific leg  Pt has not had an opportunity to obtain further blood work for thrombosis evaluation due to insurance issues  Pt reporting his leg feels similar to previous dvt  Pt reports his sister had a DVT but is unsure if it was related to pregnancy  As documented discussed inconclusive of sub-acute vs  Chronic dvt results with the pt  Discussed read from vascular tech  With joint decision making pt would like to resume [facility] therapy until further evaluation  Pt aware other options included to follow up, and have additional vascular study prior to resuming AC  Pt resumed on his Eliquis, first dose given in department  Discussed cost of eliquis and a care management request was placed  Attempted to find coupon, and asked [employer] if they had one, but was to no avail  Thrombosis panel drawn in department to assist with further work up  Pt has not had follow up with hematology yet, and has been managed by his PCP  Hematology referral placed  Discussed strict return precautions with pt  Aware to continue work up with his PCP while awaiting hematology          Amount and/or Complexity of Data Reviewed  Tests in the radiology section of CPT®: ordered and reviewed    Risk of Complications, Morbidity, and/or Mortality  General comments: Pt arrived with left lower leg swelling and calf pain  Discussed possible sub-acute vs chronic result that was given by vascular tech, and ultimately pt decided to resume AC  Reviewed reasons to return to ed  Patient verbalized understanding of diagnosis and agreement with discharge plan of care as well as understanding of reasons to return to ed  Patient Progress  Patient progress: stable      Disposition  Final diagnoses:   Acute deep vein thrombosis (DVT) of tibial vein of left lower extremity ([facility] [address] )     Time reflects when diagnosis was documented in both MDM as applicable and the Disposition within this note     Time User Action Codes Description Comment    10/14/2022  8:37 PM Jose Antonio Ko Add [I82 [id number]] Acute deep vein thrombosis (DVT) of tibial vein of left lower extremity ([facility] [address] )     10/14/2022  8:40 PM Jose Antonio Ko Modify [C34 [id number]] Acute deep vein thrombosis (DVT) of tibial vein of left lower extremity [facility])       ED Disposition     ED Disposition   Discharge    Condition   Stable    Date/Time   Fri Oct 14, 2022  8:40 PM    Comment   Augustin Romero discharge to home/self care                 Follow-up Information     Follow up With Specialties Details Why Contact Info Additional [address] Emergency Department Emergency Medicine Go to  If symptoms worsen [address] [facility] [address] [facility] Emergency Department, [address], [place], [place], [address] [facility] [place] [facility] Schedule an appointment as soon as possible for a visit in 2 days For further evaluation of symptoms [address] [phone]  [phone] [address] [facility] [place], [address] [place], [place], [facility]    PCP  Schedule an appointment as soon as possible for a visit in 1 day For further evaluation of symptoms Continue to follow up with your PCP on Monday  Discharge Medication List as of 10/14/2022  9:05 PM      START taking these medications    Details   apixaban (Eliquis) 5 mg Multiple Dosages:Starting Fri 10/14/2022, Until Thu 10/20/2022 at 2359, THEN Starting Fri 10/21/2022, Until Sat 11/12/2022 at 2359Take 2 tablets (10 mg total) by mouth 2 (two) times a day for 7 days, THEN 1 tablet (5 mg total) 2 (two) times a day for  23 days  , Normal                 PDMP Review     None          ED Provider  Electronically Signed by           DEL Patel  10/17/22 6154

## 2022-10-15 LAB — DEPRECATED AT III PPP: 84 % OF NORMAL (ref 92–136)

## 2022-10-15 PROCEDURE — 93971 EXTREMITY STUDY: CPT | Performed by: SURGERY

## 2022-10-17 LAB
PROT C AG ACT/NOR PPP IA: 85 % OF NORMAL (ref 60–150)
PROT S ACT/NOR PPP: 101 % (ref 71–117)

## 2022-10-18 ENCOUNTER — PATIENT OUTREACH (OUTPATIENT)
Dept: FAMILY MEDICINE CLINIC | Facility: CLINIC | Age: 32
End: 2022-10-18

## 2022-10-18 LAB
APTT SCREEN TO CONFIRM RATIO: 1.37 RATIO (ref 0–1.34)
B2 GLYCOPROT1 IGA SERPL IA-ACNC: 2.2
B2 GLYCOPROT1 IGG SERPL IA-ACNC: 1.2
B2 GLYCOPROT1 IGM SERPL IA-ACNC: <2.9
CARDIOLIPIN IGA SER IA-ACNC: 4.5
CARDIOLIPIN IGG SER IA-ACNC: 2
CARDIOLIPIN IGM SER IA-ACNC: 1.7
CONFIRM APTT/NORMAL: 39.7 SEC (ref 0–47.6)
DRVVT IMM 1:2 NP PPP: 44.7 SEC (ref 0–40.4)
DRVVT SCREEN TO CONFIRM RATIO: 1.2 RATIO (ref 0.8–1.2)
LA PPP-IMP: ABNORMAL
PROT S ACT/NOR PPP: 116 % (ref 61–136)
PROT S PPP-ACNC: 140 % (ref 60–150)
SCREEN APTT: 38 SEC (ref 0–51.9)
SCREEN DRVVT: 48.1 SEC (ref 0–47)
THROMBIN TIME: 17.9 SEC (ref 0–23)

## 2022-10-18 NOTE — PROGRESS NOTES
JOSE ANGEL received a referral from [address] [place] [address], Claudene Rising  Per referral, order was sent from [facility] when patient was last in the ED on 10/14  JOSE ANGEL had completed a chart review  Per chart, patient needs help affording Eliquis 5mg  JOSE ANGEL had called the patient via phone  JOSE ANGEL left a voicemail  EDUARDOCM will attempt to call the patient again at a later date and time  SWCM will continue to be available

## 2022-10-20 ENCOUNTER — PATIENT OUTREACH (OUTPATIENT)
Dept: FAMILY MEDICINE CLINIC | Facility: CLINIC | Age: 32
End: 2022-10-20

## 2022-10-20 LAB — F5 GENE MUT ANL BLD/T: NORMAL

## 2022-10-20 NOTE — PROGRESS NOTES
JOSE ANGEL had called the patient via phone  JOSE ANGEL left a voicemail  JOSE ANGEL notes this is the second phone call  EDUARDO will await a few days then mail out unable to reach letter  EDUARDO will continue to be available

## 2022-10-24 ENCOUNTER — PATIENT OUTREACH (OUTPATIENT)
Dept: FAMILY MEDICINE CLINIC | Facility: CLINIC | Age: 32
End: 2022-10-24

## 2022-10-24 LAB — F2 GENE MUT ANL BLD/T: NORMAL

## 2022-10-24 NOTE — PROGRESS NOTES
JOSE ANGEL had completed a chart review  [facility] notes patient has not called her back via phone  SW will be mailing out an unable to reach letter today  [facility] will also be closing the referral  Please reconsult SW for future needs

## 2022-10-24 NOTE — LETTER
[address] [facility]  [phone]    Re: Kenneth Contreras to reach   10/24/2022       Dear Lena Linton,    We tried to reach you by phone on and was unfortunately unable to reach you  It is important that you contact the [address] from [facility] at [phone]      Sincerely,         [address] [facility] [address]

## 2022-11-02 ENCOUNTER — CONSULT (OUTPATIENT)
Dept: HEMATOLOGY ONCOLOGY | Facility: CLINIC | Age: 32
End: 2022-11-02

## 2022-11-02 VITALS
TEMPERATURE: 99.4 F | BODY MASS INDEX: 34.99 KG/M2 | HEIGHT: 73 IN | OXYGEN SATURATION: 100 % | DIASTOLIC BLOOD PRESSURE: 84 MMHG | SYSTOLIC BLOOD PRESSURE: 121 MMHG | HEART RATE: 82 BPM | WEIGHT: 264 LBS | RESPIRATION RATE: 16 BRPM

## 2022-11-02 DIAGNOSIS — I82.442 ACUTE DEEP VEIN THROMBOSIS (DVT) OF TIBIAL VEIN OF LEFT LOWER EXTREMITY (HCC): ICD-10-CM

## 2022-11-02 NOTE — PROGRESS NOTES
Robin Urias  1990  [facility] [address] [address] [phone]    Chief Complaint   Patient presents with   • Consult           Oncology History    No history exists  History of Present Illness: In approximately 2016 patient had an unprovoked left lower extremity DVT  He was treated with Xarelto for approximately 6 months  He did well until December 2021  Left leg pain prompted a Doppler which showed acute DVT  He was started on Eliquis  This continued until August   Doppler at that time showed resolution of previous DVT  Mid October 2022 patient developed pain in the left leg  Doppler showed focal occlusive subacute versus chronic DVT in the posterior tibial veins  Thrombosis panel at that time showed positive lupus anticoagulant, anti thrombin-III 84, low normal 92  Review of Systems   Constitutional: Negative for chills and fever  HENT: Negative for nosebleeds  Eyes: Negative for discharge  Respiratory: Negative for cough and shortness of breath  Cardiovascular: Negative for chest pain  Gastrointestinal: Negative for abdominal pain, constipation and diarrhea  Endocrine: Negative for polydipsia  Genitourinary: Negative for hematuria  Musculoskeletal: Negative for arthralgias  Skin: Negative for color change  Allergic/Immunologic: Negative for immunocompromised state  Neurological: Negative for dizziness and headaches  Hematological: Negative for adenopathy  Psychiatric/Behavioral: Negative for agitation         Patient Active Problem List   Diagnosis   • Acute deep vein thrombosis (DVT) of tibial vein of left lower extremity (HCC)   • Class 1 obesity due to excess calories without serious comorbidity with body mass index (BMI) of 33 0 to 33 9 in adult   • History of DVT (deep vein thrombosis)   • COVID-19     Past Medical History:   Diagnosis Date   • H/O blood clots Had one in 2016, then another in 2021  Past Surgical History:   Procedure Laterality Date   • TONSILLECTOMY       Family History   Problem Relation Age of Onset   • Hypertension Mother    • Diabetes type II Mother    • No Known Problems Father         Dad not in the picture   • Deep vein thrombosis Sister    • No Known Problems Sister    • No Known Problems Sister    • No Known Problems Brother    • No Known Problems Daughter      Social History     Socioeconomic History   • Marital status: /Civil Union     Spouse name: Not on file   • Number of children: Not on file   • Years of education: Not on file   • Highest education level: Not on file   Occupational History   • Not on file   Tobacco Use   • Smoking status: Never Smoker   • Smokeless tobacco: Never Used   Vaping Use   • Vaping Use: Never used   Substance and Sexual Activity   • Alcohol use: Never   • Drug use: Never   • Sexual activity: Not on file   Other Topics Concern   • Not on file   Social History Narrative   • Not on file     Social Determinants of Health     Financial Resource Strain: Not on file   Food Insecurity: Not on file   Transportation Needs: Not on file   Physical Activity: Not on file   Stress: Not on file   Social Connections: Not on file   Intimate Partner Violence: Not on file   Housing Stability: Not on file       Current Outpatient Medications:   •  apixaban (Eliquis) 5 mg, Take 2 tablets (10 mg total) by mouth 2 (two) times a day for 7 days, THEN 1 tablet (5 mg total) 2 (two) times a day for 23 days  , Disp: 74 tablet, Rfl: 0  No Known Allergies  Vitals:    11/02/22 1001   BP: 121/84   Pulse: 82   Resp: 16   Temp: 99 4 °F (37 4 °C)   SpO2: 100%       Physical Exam  Constitutional:       Appearance: He is well-developed  HENT:      Head: Normocephalic and atraumatic  Eyes:      Pupils: Pupils are equal, round, and reactive to light  Cardiovascular:      Rate and Rhythm: Normal rate and regular rhythm        Heart sounds: No murmur heard  Pulmonary:      Breath sounds: Normal breath sounds  No wheezing or rales  Abdominal:      Palpations: Abdomen is soft  Tenderness: There is no abdominal tenderness  Musculoskeletal:         General: No tenderness  Normal range of motion  Cervical back: Neck supple  Lymphadenopathy:      Cervical: No cervical adenopathy  Skin:     Findings: No erythema or rash  Neurological:      Mental Status: He is alert and oriented to person, place, and time  Cranial Nerves: No cranial nerve deficit  Deep Tendon Reflexes: Reflexes are normal and symmetric  Psychiatric:         Behavior: Behavior normal            Labs:  CBC, Coags, BMP, Mg, Phos    Imaging  VAS lower limb venous duplex study, unilateral/limited    Result Date: 10/15/2022  Narrative:  [facility] REPORT [facility]INICAL: Indications: Patient presents with left lower extremity calf pain for one week  Patient has history of left posterior tibial vein DVT and is no longer on blood thinners  Operative History: Patient denies any cardiovascular procedures Risk Factors The patient has history of DVT  FINDINGS:  Left        Impression                        PostTibial  E3  Occlusive Segmental (Chronic)     CONCLUSION:  Impression: RIGHT LOWER LIMB LIMITED: Evaluation shows no evidence of thrombus in the common femoral vein  Doppler evaluation shows a normal response to augmentation maneuvers  LEFT LOWER LIMB: Evidence of focal, occlusive, subacute vs chronic deep vein thrombosis noted in one of the paired posterior tibial veins at the proximal calf  No evidence of superficial thrombophlebitis noted  Doppler evaluation shows a normal response to augmentation maneuvers  Popliteal, posterior tibial and anterior tibial arterial Doppler waveforms are triphasic  Technical findings were given to [facility] CRNP via tiger text    SIGNATURE: Electronically Signed by: Reva Banuelos MD on 2022-10-15 10:30:02 PM    I reviewed the above laboratory and imaging data  Discussion/Summary:  In summary, this is a 45-year-old male with a 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  Anti thrombin 3 was slightly depressed  This can be seen in the setting of an acute DVT  Repeat is requested to validate  Additionally he had an abnormal lupus anticoagulant  Repeat in 12 weeks to establish persistence  If lupus anticoagulant is persistent, this suggests that Coumadin may have superiority over other agents  I suggested continue Eliquis for the moment  Suspend therapy for 3 days in early February 2023, have repeat blood work, restart Eliquis after the blood work, I will see him thereafter  We reviewed that it is likely that indefinite anticoagulation will be recommended  We reviewed some information about Coumadin therapy, need for adjustments, population variability, etcetera  The patient and his wife voiced understanding of the above discussion

## 2022-11-09 ENCOUNTER — OFFICE VISIT (OUTPATIENT)
Dept: FAMILY MEDICINE CLINIC | Facility: CLINIC | Age: 32
End: 2022-11-09

## 2022-11-09 VITALS
OXYGEN SATURATION: 99 % | DIASTOLIC BLOOD PRESSURE: 90 MMHG | WEIGHT: 271 LBS | SYSTOLIC BLOOD PRESSURE: 140 MMHG | BODY MASS INDEX: 35.75 KG/M2 | TEMPERATURE: 97.8 F | HEART RATE: 104 BPM

## 2022-11-09 DIAGNOSIS — I82.442 ACUTE DEEP VEIN THROMBOSIS (DVT) OF TIBIAL VEIN OF LEFT LOWER EXTREMITY (HCC): Primary | ICD-10-CM

## 2022-11-09 NOTE — PROGRESS NOTES
Name: Kaley Joy      : 1990      MRN: [id number]  Encounter Provider: Maxine Hood DO  Encounter Date: 2022   Encounter department: [facility]    Assessment & Plan     1  Acute deep vein thrombosis (DVT) of tibial vein of left lower extremity ([address] )  Assessment & Plan: Third unprovoked DVT in LLE with positive lupus anticoagulant and decreased antithrombin 3  Patient Follows with hematology with next appointment on 23  Hematology mentioned that antithrombin 3 can be slightly decreased in the setting of acute DVT  Therefore, he will repeat blood work for lupus anticoagulant, antithrombin 3, and factor 8 Ag and activity in 12 weeks and follow up with hematology  Hematology notes that if lupus anticoagulant is persistent then they may switch to Coumadin due to superiority  Patient was concerned his recurrent DVTs were related to cancer  Discussed and reassured the patient that it is unlikely related to cancer as the cause of his unprovoked DVTs due to reassuring blood work, negative ROS, and positive etiology on thrombosis work-up  Discussed likely cause is autoimmune clotting disorder  Plan:  Continue Eliquis as per hematology consult  Continue with hematology's plan and blood work  - Suspend therapy for 3 days in early 2023, have repeat blood work, restart Eliquis after the blood work  -Follow-up after blood work and hematology visit is completed for annual physical exam () and as needed sooner if concerns occur  Subjective      HPI     30 yo male with history of recurrent unprovoked DVTs presents for ED follow up  He recently stopped his Eliquis as instructed after completion of his 8 months anticoagulation therapy and resolution of his DVT on duplex dopplers with plans to complete thrombosis panel but was unable to complete due to insurance issues   Since discontinuing Eliquis, he tested positive for COVID at the end of September and was evaluated in the ED on 10/14/22 for his third recurrent unprovoked left lower extremity DVT  Dopplers showed focal, occlusive, subacute vs chronic deep vein thrombosis noted in  one of the paired posterior tibial veins at the proximal calf  Thrombosis panel at that time showed positive lupus anticoagulant, decreased anti thrombin-III  He was seen on 11/02/22 by hematology  Hematology plans to repeat lupus anticoagulant, antithrombin III, and Factor 8 Antigen and activity in 12 weeks and notes if positive lupus anticouagulant, then he will be switched from Eliquis to Coumadin  In the meantime, it was recommended to continue Eliquis  Currently denies any chest pain, SOB, chest palpations, fevers, chills, leg pain  Summary of his DVT history  per hematology below:  "In approximately 2016 patient had an unprovoked left lower extremity DVT  He was treated with Xarelto for approximately 6 months  He did well until December 2021  Left leg pain prompted a Doppler which showed acute DVT  He was started on Eliquis  This continued until August   Doppler at that time showed resolution of previous DVT  Mid October 2022 patient developed pain in the left leg  Doppler showed focal occlusive subacute versus chronic DVT in the posterior tibial veins  Thrombosis panel at that time showed positive lupus anticoagulant, anti thrombin-III 84, low normal 92 "    Review of Systems   Constitutional: Negative for appetite change, chills, fatigue, fever and unexpected weight change  Respiratory: Negative for chest tightness and shortness of breath  Cardiovascular: Negative for chest pain, palpitations and leg swelling  Gastrointestinal: Negative for abdominal pain, blood in stool, constipation, diarrhea, nausea and vomiting  Genitourinary: Negative for dysuria and hematuria  Musculoskeletal: Negative for arthralgias, back pain, joint swelling and myalgias     Neurological: Negative for dizziness, weakness, light-headedness and headaches  Hematological: Negative for adenopathy  Bruises/bleeds easily (Noted occasional gum bleeding)  Psychiatric/Behavioral: The patient is nervous/anxious  Current Outpatient Medications on File Prior to Visit   Medication Sig   • apixaban (Eliquis) 5 mg Take 2 tablets (10 mg total) by mouth 2 (two) times a day for 7 days, THEN 1 tablet (5 mg total) 2 (two) times a day for 23 days  Objective     /90   Pulse 104   Temp 97 8 °F (36 6 °C)   Wt 123 kg (271 lb)   SpO2 99%   BMI 35 75 kg/m²     Physical Exam  Constitutional:       General: He is not in acute distress  Appearance: Normal appearance  HENT:      Head: Normocephalic  Eyes:      General: No scleral icterus  Cardiovascular:      Rate and Rhythm: Normal rate and regular rhythm  Pulses: Normal pulses  Heart sounds: Normal heart sounds  No murmur heard  No gallop  Pulmonary:      Effort: Pulmonary effort is normal  No respiratory distress  Breath sounds: Normal breath sounds  No wheezing, rhonchi or rales  Chest:   Breasts:      Right: No axillary adenopathy or supraclavicular adenopathy  Left: No axillary adenopathy or supraclavicular adenopathy  Abdominal:      General: Abdomen is flat  Bowel sounds are normal  There is no distension  Palpations: Abdomen is soft  There is no mass  Tenderness: There is no abdominal tenderness  There is no guarding or rebound  Musculoskeletal:         General: No tenderness  Cervical back: No tenderness  Right lower leg: No edema  Left lower leg: No edema  Lymphadenopathy:      Cervical: No cervical adenopathy  Upper Body:      Right upper body: No supraclavicular, axillary or pectoral adenopathy  Left upper body: No supraclavicular, axillary or pectoral adenopathy  Skin:     General: Skin is warm and dry  Neurological:      Mental Status: He is alert     Psychiatric: Mood and Affect: Mood normal          Behavior: Behavior normal        Love Repress, DO

## 2022-11-09 NOTE — ASSESSMENT & PLAN NOTE
Third unprovoked DVT in LLE with positive lupus anticoagulant and decreased antithrombin 3  Patient Follows with hematology with next appointment on 02/02/23  Hematology mentioned that antithrombin 3 can be slightly decreased in the setting of acute DVT  Therefore, he will repeat blood work for lupus anticoagulant, antithrombin 3, and factor 8 Ag and activity in 12 weeks and follow up with hematology  Hematology notes that if lupus anticoagulant is persistent then they may switch to Coumadin due to superiority  Patient was concerned his recurrent DVTs were related to cancer  Discussed and reassured the patient that it is unlikely related to cancer as the cause of his unprovoked DVTs due to reassuring blood work, negative ROS, and positive etiology on thrombosis work-up  Discussed likely cause is autoimmune clotting disorder  Plan:  Continue Eliquis as per hematology consult  Continue with hematology's plan and blood work  - Suspend therapy for 3 days in early February 2023, have repeat blood work, restart Eliquis after the blood work  -Follow-up after blood work and hematology visit is completed for annual physical exam (03/22) and as needed sooner if concerns occur

## 2023-02-01 ENCOUNTER — APPOINTMENT (OUTPATIENT)
Dept: LAB | Age: 33
End: 2023-02-01

## 2023-02-01 ENCOUNTER — TELEPHONE (OUTPATIENT)
Dept: HEMATOLOGY ONCOLOGY | Facility: HOSPITAL | Age: 33
End: 2023-02-01

## 2023-02-01 ENCOUNTER — DOCUMENTATION (OUTPATIENT)
Dept: HEMATOLOGY ONCOLOGY | Facility: HOSPITAL | Age: 33
End: 2023-02-01

## 2023-02-01 DIAGNOSIS — Z86.718 HISTORY OF DVT (DEEP VEIN THROMBOSIS): ICD-10-CM

## 2023-02-01 DIAGNOSIS — I82.442 ACUTE DEEP VEIN THROMBOSIS (DVT) OF TIBIAL VEIN OF LEFT LOWER EXTREMITY (HCC): ICD-10-CM

## 2023-02-02 ENCOUNTER — TELEPHONE (OUTPATIENT)
Dept: HEMATOLOGY ONCOLOGY | Facility: CLINIC | Age: 33
End: 2023-02-02

## 2023-02-02 ENCOUNTER — OFFICE VISIT (OUTPATIENT)
Dept: HEMATOLOGY ONCOLOGY | Facility: CLINIC | Age: 33
End: 2023-02-02

## 2023-02-02 VITALS
BODY MASS INDEX: 35.78 KG/M2 | RESPIRATION RATE: 16 BRPM | DIASTOLIC BLOOD PRESSURE: 88 MMHG | WEIGHT: 270 LBS | TEMPERATURE: 97.9 F | HEIGHT: 73 IN | OXYGEN SATURATION: 97 % | HEART RATE: 89 BPM | SYSTOLIC BLOOD PRESSURE: 136 MMHG

## 2023-02-02 DIAGNOSIS — D68.59 ANTITHROMBIN III DEFICIENCY (HCC): ICD-10-CM

## 2023-02-02 DIAGNOSIS — Z86.718 HISTORY OF DVT (DEEP VEIN THROMBOSIS): Primary | ICD-10-CM

## 2023-02-02 DIAGNOSIS — R76.0 LUPUS ANTICOAGULANT POSITIVE: ICD-10-CM

## 2023-02-02 LAB
DEPRECATED AT III PPP: 90 % OF NORMAL (ref 92–136)
PROT C AG ACT/NOR PPP IA: 100 % OF NORMAL (ref 60–150)
PROT S ACT/NOR PPP: 100 % (ref 71–117)

## 2023-02-02 NOTE — PROGRESS NOTES
[facility] [place]   [address] [address]  [phone]    Abdoul Federico,1990, [id number]  02/02/23    Discussion:   In summary, this is a 62-year-old male with a history of recurrent DVT  He had slightly depressed Antithrombin III after his third episode in October 2022  This has now been repeated 3 months later and is still slightly below normal, 90, low normal 92  Nonetheless, it is quite unusual to see recurrent thrombosis in a 62-year-old with his first clot happening at age 27, unprovoked  Based on all of this I would say that it is highly likely that Antithrombin III deficiency, although mild, is likely contributing to his recurrent thromboses  I would recommend indefinite anticoagulation with Eliquis 2 5 mg every 12  The risk of this intervention is fairly low and potential benefit is substantial   Relative risk of thrombosis with Antithrombin III deficiency is up to 16 0  I would expect this to reduce by half or more with intervention  Patiently, we discussed the possibility of testing first-degree relatives  This would have minimal impact on males, avoid cigarette smoking or high risk situations, but will have more impact on females, avoid cigarette smoking and hormonal contraceptives  The remainder of thrombosis panel is pending  Prior lupus anticoagulant was positive  Repeat is awaited  I discussed the above with the patient  The patient and his wife voiced understanding and agreement   ______________________________________________________________________    Chief Complaint   Patient presents with   • Follow-up       HPI:  Oncology History    No history exists  Interval History: Clinically stable  ECOG-  1 - Symptomatic but completely ambulatory    Review of Systems   Constitutional: Negative for chills and fever  HENT: Negative for nosebleeds  Eyes: Negative for discharge     Respiratory: Negative for cough and shortness of breath  Cardiovascular: Negative for chest pain  Gastrointestinal: Negative for abdominal pain, constipation and diarrhea  Endocrine: Negative for polydipsia  Genitourinary: Negative for hematuria  Musculoskeletal: Negative for arthralgias  Skin: Negative for color change  Allergic/Immunologic: Negative for immunocompromised state  Neurological: Negative for dizziness and headaches  Hematological: Negative for adenopathy  Psychiatric/Behavioral: Negative for agitation  Past Medical History:   Diagnosis Date   • H/O blood clots     Had one in 2016, then another in 2021  Patient Active Problem List   Diagnosis   • Acute deep vein thrombosis (DVT) of tibial vein of left lower extremity (HCC)   • Class 1 obesity due to excess calories without serious comorbidity with body mass index (BMI) of 33 0 to 33 9 in adult   • History of DVT (deep vein thrombosis)   • COVID-19       Current Outpatient Medications:   •  apixaban (Eliquis) 5 mg, Take 2 tablets (10 mg total) by mouth 2 (two) times a day for 7 days, THEN 1 tablet (5 mg total) 2 (two) times a day for 23 days  , Disp: 74 tablet, Rfl: 0  No Known Allergies  Past Surgical History:   Procedure Laterality Date   • TONSILLECTOMY       Social History     Objective:  Vitals:    02/02/23 0918   BP: 136/88   BP Location: Left arm   Patient Position: Sitting   Cuff Size: Standard   Pulse: 89   Resp: 16   Temp: 97 9 °F (36 6 °C)   TempSrc: Temporal   SpO2: 97%   Weight: 122 kg (270 lb)   Height: 6' 1" (1 854 m)     Physical Exam  Constitutional:       Appearance: He is well-developed  HENT:      Head: Normocephalic and atraumatic  Eyes:      Pupils: Pupils are equal, round, and reactive to light  Cardiovascular:      Rate and Rhythm: Normal rate and regular rhythm  Heart sounds: No murmur heard  Pulmonary:      Breath sounds: Normal breath sounds  No wheezing or rales     Abdominal:      Palpations: Abdomen is soft  Tenderness: There is no abdominal tenderness  Musculoskeletal:         General: No tenderness  Normal range of motion  Cervical back: Neck supple  Lymphadenopathy:      Cervical: No cervical adenopathy  Skin:     Findings: No erythema or rash  Neurological:      Mental Status: He is alert and oriented to person, place, and time  Cranial Nerves: No cranial nerve deficit  Deep Tendon Reflexes: Reflexes are normal and symmetric  Psychiatric:         Behavior: Behavior normal            Labs: I personally reviewed the labs and imaging pertinent to this patient care

## 2023-02-02 NOTE — TELEPHONE ENCOUNTER
Appointment Confirmation (to confirm pre existing appointments - ONLY)  No need to route   Who is calling to confirm? Spouse   If someone other than patient is calling, are they listed on the communication consent form?  Yes   Appointment with  Dr Gamez List   Appointment date & time  02/02/23 9:20AM   Appointment location [facility]   Patient verbilized understanding Yes     Confirming appointment location

## 2023-02-03 LAB
APTT SCREEN TO CONFIRM RATIO: 1.04 RATIO (ref 0–1.34)
B2 GLYCOPROT1 IGA SERPL IA-ACNC: 3.9
B2 GLYCOPROT1 IGG SERPL IA-ACNC: 1.7
B2 GLYCOPROT1 IGM SERPL IA-ACNC: <2.4
CARDIOLIPIN IGA SER IA-ACNC: 7.2
CARDIOLIPIN IGG SER IA-ACNC: 2.3
CARDIOLIPIN IGM SER IA-ACNC: 2.4
CONFIRM APTT/NORMAL: 40.3 SEC (ref 0–47.6)
DRVVT IMM 1:2 NP PPP: 43.8 SEC (ref 0–40.4)
DRVVT SCREEN TO CONFIRM RATIO: 1.2 RATIO (ref 0.8–1.2)
LA PPP-IMP: ABNORMAL
SCREEN APTT: 37 SEC (ref 0–51.9)
SCREEN DRVVT: 50.4 SEC (ref 0–47)
THROMBIN TIME: 17.6 SEC (ref 0–23)

## 2023-02-04 LAB
FACT XIIIA PPP-ACNC: 155 % (ref 56–140)
PROT S ACT/NOR PPP: 121 % (ref 61–136)
PROT S PPP-ACNC: 139 % (ref 60–150)

## 2023-02-07 LAB
F2 GENE MUT ANL BLD/T: NORMAL
F5 GENE MUT ANL BLD/T: NORMAL
FACT VIII AG ACT/NOR PPP IA: 135 %

## 2023-03-07 DIAGNOSIS — D68.59 ANTITHROMBIN III DEFICIENCY (HCC): Primary | ICD-10-CM

## 2023-03-07 DIAGNOSIS — Z86.718 HISTORY OF DVT (DEEP VEIN THROMBOSIS): ICD-10-CM

## 2023-03-15 ENCOUNTER — OFFICE VISIT (OUTPATIENT)
Dept: FAMILY MEDICINE CLINIC | Facility: CLINIC | Age: 33
End: 2023-03-15

## 2023-03-15 VITALS
DIASTOLIC BLOOD PRESSURE: 84 MMHG | BODY MASS INDEX: 35.65 KG/M2 | TEMPERATURE: 99.4 F | OXYGEN SATURATION: 98 % | WEIGHT: 269 LBS | SYSTOLIC BLOOD PRESSURE: 126 MMHG | HEART RATE: 102 BPM | HEIGHT: 73 IN

## 2023-03-15 DIAGNOSIS — D68.59 ANTITHROMBIN III DEFICIENCY (HCC): Primary | ICD-10-CM

## 2023-03-15 DIAGNOSIS — I82.442 ACUTE DEEP VEIN THROMBOSIS (DVT) OF TIBIAL VEIN OF LEFT LOWER EXTREMITY (HCC): ICD-10-CM

## 2023-03-15 DIAGNOSIS — Z00.00 ANNUAL PHYSICAL EXAM: ICD-10-CM

## 2023-03-15 DIAGNOSIS — M79.89 LEFT LEG SWELLING: ICD-10-CM

## 2023-03-15 DIAGNOSIS — Z00.00 HEALTH CARE MAINTENANCE: ICD-10-CM

## 2023-03-15 DIAGNOSIS — E66.09 CLASS 1 OBESITY DUE TO EXCESS CALORIES WITHOUT SERIOUS COMORBIDITY WITH BODY MASS INDEX (BMI) OF 33.0 TO 33.9 IN ADULT: ICD-10-CM

## 2023-03-15 NOTE — PROGRESS NOTES
[address] [facility]    NAME: Magdalena Roy  AGE: 34 y o  SEX: male  : 1990     DATE: 3/15/2023     Assessment and Plan:     Problem List Items Addressed This Visit        Cardiovascular and Mediastinum    Acute deep vein thrombosis (DVT) of tibial vein of left lower extremity ([facility] [address] )     · -unprovoked left lower extremity DVT, treated with Xarelto for 6 months  · 2021-Acute DVT in left leg, started on Eliquis for 8 months  Doppler at that time showed resolution of previous DVT  · Mid 2022- focal occlusive subacute versus chronic DVT in the posterior tibial veins  · Thrombosis panel at that time showed positive lupus anticoagulant, anti thrombin-III 84, low normal 92  · Pt follows with Hematology on Eliquis 2 5mg    Unlikely recurrent DVT as patient has been compliant with medication and is still taking Eliquis 5mg BID instead of the 2 5mg dose recommended by Hematology  However, Given patient's history, will repeat left lower leg venous duplex to rule out DVT with new left leg pain and swelling               Hematopoietic and Hemostatic    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     · Third unprovoked DVT in LLE with positive lupus anticoagulant and decreased antithrombin 3   · Patient Follows with hematology for mild Antithrombin III deficiency     · Plan:  · Continue Eliquis 2 5mg BID as per hematology  · Follow up Left venous duplex to r/o DVT         Relevant Orders    VAS lower limb venous duplex study, unilateral/limited       Other    Health care maintenance     Preventative Blood work  · Lipid panel for CVD-ordered  · BMP-last check 10/14/2022, normal  · HIV - negative on 2022  · Hep C -negative on 2022  Preventative Screenings:  · Colonscopy-N/A, no family history  · Depression Screening- negative  Counseling  · Lifestyle changes  Immunizations  · Patient declined Tdap, flu, COVID vaccine  Class 1 obesity due to excess calories without serious comorbidity with body mass index (BMI) of 33 0 to 33 9 in adult     There is no height or weight on file to calculate BMI  • Recommend incorporating a more whole foods plant-predominant diet along with decreasing consumption of red meats and processed foods  o Goals:  - Eat 2 vegetables per day  - Decreased sugary drinks  • Per AHA guidelines, recommend moderate-vigorous intensity exercise for 30 minutes a day for 5 days a week or a total of 150 min/week  o Goals:  - Walk for 10 minutes every other day           Relevant Orders    Lipid panel   Other Visit Diagnoses     Left leg swelling        Relevant Orders    VAS lower limb venous duplex study, unilateral/limited    Annual physical exam              Immunizations and preventive care screenings were discussed with patient today  Appropriate education was printed on patient's after visit summary  Counseling:  Alcohol/drug use: discussed moderation in alcohol intake, the recommendations for healthy alcohol use, and avoidance of illicit drug use  Dental Health: discussed importance of regular tooth brushing, flossing, and dental visits  Injury prevention: discussed safety/seat belts, safety helmets, smoke detectors, carbon dioxide detectors, and smoking near bedding or upholstery  Sexual health: discussed sexually transmitted diseases, partner selection, use of condoms, avoidance of unintended pregnancy, and contraceptive alternatives  · Exercise: the importance of regular exercise/physical activity was discussed  Recommend exercise 3-5 times per week for at least 30 minutes  BMI Counseling: Body mass index is 35 49 kg/m²  The BMI is above normal  Nutrition recommendations include decreasing portion sizes, encouraging healthy choices of fruits and vegetables, decreasing fast food intake, consuming healthier snacks and limiting drinks that contain sugar   Exercise recommendations include moderate physical activity 150 minutes/week and exercising 3-5 times per week  No pharmacotherapy was ordered  Patient referred to PCP  Rationale for BMI follow-up plan is due to patient being overweight or obese  Depression Screening and Follow-up Plan: Patient was screened for depression during today's encounter  They screened negative with a PHQ-2 score of 0  Return in about 1 year (around 3/15/2024), or if symptoms worsen or fail to improve, for Annual physical      Chief Complaint:     Chief Complaint   Patient presents with   • Physical Exam     Pain in the left leg       History of Present Illness:     Adult Annual Physical   Patient here for a comprehensive physical exam  The patient reports problems - left leg swelling and pain  Patient reports sharp intermittent left lower leg pain began last night  Denied if being a cramp and it did not resolve with increased fluid intake  Did not take any medications for the pain  Patient continues to take Eliquis 5mg BID and is concerned about decreasing his dose to 2 5mg BID  Patient recently laid off from his job at [employer]  Currently looking for a [employer] job  Overall reports his mood is stable  No difficulties at home  Denies any tobacco products, alcohol products, or recreational drug use  Diet and Physical Activity  · Diet/Nutrition: Decreased fruits and vegetables, and increased sugary drinks  Often eats fast food  · Exercise: no formal exercise  Depression Screening  PHQ-2/9 Depression Screening    Little interest or pleasure in doing things: 0 - not at all  Feeling down, depressed, or hopeless: 0 - not at all  PHQ-2 Score: 0  PHQ-2 Interpretation: Negative depression screen       General Health  · Sleep: sleeps well, gets more than 8 hours of sleep on average, snores loudly and patient previously evaluated by sleep medicine in [place]  Does have LASHELL  Patient does not use CPAP  Patient declined re-evaluation for LASHELL   8-9 days, Does not use CPAP  · Hearing: normal - bilateral   · Vision: no vision problems, wears glasses and Last eye exam 1 year ago      · Dental: regular dental visits, brushes teeth twice daily and flosses teeth occasionally   Health  · History of STDs?: no  No urinary or sexual dysfunction concerns  Review of Systems:     Review of Systems   Constitutional: Negative for chills, fatigue, fever and unexpected weight change  HENT: Negative for congestion, ear pain, hearing loss, rhinorrhea and sore throat  Eyes: Negative for pain and visual disturbance  Respiratory: Negative for cough and shortness of breath  Cardiovascular: Positive for leg swelling  Negative for chest pain and palpitations  Gastrointestinal: Negative for abdominal pain, blood in stool, constipation and diarrhea  Genitourinary: Negative for dysuria and hematuria  Musculoskeletal: Positive for myalgias (left leg)  Negative for arthralgias and back pain  Skin: Negative for color change and rash  Neurological: Negative for syncope and headaches  Past Medical History:     Past Medical History:   Diagnosis Date   • H/O blood clots     Had one in 2016, then another in 2021        Past Surgical History:     Past Surgical History:   Procedure Laterality Date   • TONSILLECTOMY        Social History:     Social History     Socioeconomic History   • Marital status: /Civil Union     Spouse name: None   • Number of children: None   • Years of education: None   • Highest education level: None   Occupational History   • None   Tobacco Use   • Smoking status: Never   • Smokeless tobacco: Never   Vaping Use   • Vaping Use: Never used   Substance and Sexual Activity   • Alcohol use: Never   • Drug use: Never   • Sexual activity: None   Other Topics Concern   • None   Social History Narrative   • None     Social Determinants of Health     Financial Resource Strain: Not on file   Food Insecurity: Not on file   Transportation Needs: Not on file   Physical Activity: Not on file   Stress: Not on file   Social Connections: Not on file   Intimate Partner Violence: Not on file   Housing Stability: Not on file      Family History:     Family History   Problem Relation Age of Onset   • Hypertension Mother    • Diabetes type II Mother    • No Known Problems Father         Dad not in the picture   • Deep vein thrombosis Sister    • No Known Problems Sister    • No Known Problems Sister    • No Known Problems Brother    • No Known Problems Daughter       Current Medications:     Current Outpatient Medications   Medication Sig Dispense Refill   • apixaban (Eliquis) 2 5 mg Take 1 tablet (2 5 mg total) by mouth 2 (two) times a day 60 tablet 2     No current facility-administered medications for this visit  Allergies:     No Known Allergies   Physical Exam:     /84   Pulse 102   Temp 99 4 °F (37 4 °C) (Tympanic)   Ht 6' 1" (1 854 m)   Wt 122 kg (269 lb)   SpO2 98%   BMI 35 49 kg/m²     Physical Exam  Vitals and nursing note reviewed  Constitutional:       General: He is not in acute distress  Appearance: He is well-developed  HENT:      Head: Normocephalic and atraumatic  Right Ear: Tympanic membrane and ear canal normal       Left Ear: Tympanic membrane and ear canal normal       Nose: Nose normal  No congestion or rhinorrhea  Mouth/Throat:      Mouth: Mucous membranes are moist       Pharynx: Oropharynx is clear  No oropharyngeal exudate or posterior oropharyngeal erythema  Eyes:      Conjunctiva/sclera: Conjunctivae normal       Pupils: Pupils are equal, round, and reactive to light  Cardiovascular:      Rate and Rhythm: Normal rate and regular rhythm  Heart sounds: No murmur heard  Pulmonary:      Effort: Pulmonary effort is normal  No respiratory distress  Breath sounds: Normal breath sounds  Abdominal:      General: Bowel sounds are normal  There is no distension  Palpations: Abdomen is soft  Tenderness: There is no abdominal tenderness  Musculoskeletal:         General: No swelling or tenderness  Cervical back: Neck supple  Right lower leg: No edema  Left lower leg: No edema  Comments: Left leg similar sized compared to right leg  No erythema, no warmth  No tenderness to palpation  Lymphadenopathy:      Cervical: No cervical adenopathy  Skin:     General: Skin is warm and dry  Neurological:      Mental Status: He is alert     Psychiatric:         Mood and Affect: Mood normal           Keyana Zapata DO   [facility] [place]

## 2023-05-04 ENCOUNTER — TELEPHONE (OUTPATIENT)
Dept: FAMILY MEDICINE CLINIC | Facility: CLINIC | Age: 33
End: 2023-05-04

## 2023-05-04 NOTE — TELEPHONE ENCOUNTER
Medical clearance form for a dental procedure in preceptor folder to be completed and returned for faxing  The dental office would like to have the patient scheduled for procedure today or tomorrow, so would like the clearance back ASAP, if possible  Please advise, thank you

## 2023-05-13 PROBLEM — Z00.00 HEALTH CARE MAINTENANCE: Status: RESOLVED | Noted: 2022-01-03 | Resolved: 2023-05-13

## 2023-06-18 DIAGNOSIS — Z86.718 HISTORY OF DVT (DEEP VEIN THROMBOSIS): ICD-10-CM

## 2023-06-18 DIAGNOSIS — D68.59 ANTITHROMBIN III DEFICIENCY (HCC): ICD-10-CM

## 2023-06-19 RX ORDER — APIXABAN 2.5 MG/1
TABLET, FILM COATED ORAL
Qty: 60 TABLET | Refills: 0 | Status: SHIPPED | OUTPATIENT
Start: 2023-06-19

## 2023-09-02 ENCOUNTER — APPOINTMENT (EMERGENCY)
Dept: RADIOLOGY | Facility: HOSPITAL | Age: 33
End: 2023-09-02

## 2023-09-02 ENCOUNTER — APPOINTMENT (EMERGENCY)
Dept: CT IMAGING | Facility: HOSPITAL | Age: 33
End: 2023-09-02

## 2023-09-02 ENCOUNTER — HOSPITAL ENCOUNTER (EMERGENCY)
Facility: HOSPITAL | Age: 33
Discharge: HOME | End: 2023-09-02
Attending: EMERGENCY MEDICINE

## 2023-09-02 VITALS
TEMPERATURE: 98.7 F | HEART RATE: 87 BPM | RESPIRATION RATE: 18 BRPM | SYSTOLIC BLOOD PRESSURE: 125 MMHG | OXYGEN SATURATION: 96 % | DIASTOLIC BLOOD PRESSURE: 60 MMHG

## 2023-09-02 DIAGNOSIS — R07.89 ATYPICAL CHEST PAIN: Primary | ICD-10-CM

## 2023-09-02 DIAGNOSIS — D68.59 ANTITHROMBIN III DEFICIENCY (HCC): ICD-10-CM

## 2023-09-02 LAB
2HR DELTA HS TROPONIN: 0 NG/L
ALBUMIN SERPL BCP-MCNC: 4.4 G/DL (ref 3.5–5)
ALP SERPL-CCNC: 88 U/L (ref 34–104)
ALT SERPL W P-5'-P-CCNC: 34 U/L (ref 7–52)
ANION GAP SERPL CALCULATED.3IONS-SCNC: 6 MMOL/L
APTT PPP: 29 SECONDS (ref 23–37)
AST SERPL W P-5'-P-CCNC: 23 U/L (ref 13–39)
ATRIAL RATE: 91 BPM
ATRIAL RATE: 99 BPM
BASOPHILS # BLD AUTO: 0.06 THOUSANDS/ÂΜL (ref 0–0.1)
BASOPHILS NFR BLD AUTO: 1 % (ref 0–1)
BILIRUB SERPL-MCNC: 0.7 MG/DL (ref 0.2–1)
BNP SERPL-MCNC: 1 PG/ML (ref 0–100)
BUN SERPL-MCNC: 11 MG/DL (ref 5–25)
CALCIUM SERPL-MCNC: 9.3 MG/DL (ref 8.4–10.2)
CARDIAC TROPONIN I PNL SERPL HS: 4 NG/L
CARDIAC TROPONIN I PNL SERPL HS: 4 NG/L
CHLORIDE SERPL-SCNC: 106 MMOL/L (ref 96–108)
CO2 SERPL-SCNC: 28 MMOL/L (ref 21–32)
CREAT SERPL-MCNC: 1 MG/DL (ref 0.6–1.3)
EOSINOPHIL # BLD AUTO: 0.1 THOUSAND/ÂΜL (ref 0–0.61)
EOSINOPHIL NFR BLD AUTO: 2 % (ref 0–6)
ERYTHROCYTE [DISTWIDTH] IN BLOOD BY AUTOMATED COUNT: 12.7 % (ref 11.6–15.1)
FLUAV RNA RESP QL NAA+PROBE: NEGATIVE
FLUBV RNA RESP QL NAA+PROBE: NEGATIVE
GFR SERPL CREATININE-BSD FRML MDRD: 99 ML/MIN/1.73SQ M
GLUCOSE SERPL-MCNC: 81 MG/DL (ref 65–140)
HCT VFR BLD AUTO: 46.8 % (ref 36.5–49.3)
HGB BLD-MCNC: 15.8 G/DL (ref 12–17)
IMM GRANULOCYTES # BLD AUTO: 0.02 THOUSAND/UL (ref 0–0.2)
IMM GRANULOCYTES NFR BLD AUTO: 0 % (ref 0–2)
INR PPP: 1.1 (ref 0.84–1.19)
LYMPHOCYTES # BLD AUTO: 2.01 THOUSANDS/ÂΜL (ref 0.6–4.47)
LYMPHOCYTES NFR BLD AUTO: 34 % (ref 14–44)
MCH RBC QN AUTO: 29.7 PG (ref 26.8–34.3)
MCHC RBC AUTO-ENTMCNC: 33.8 G/DL (ref 31.4–37.4)
MCV RBC AUTO: 88 FL (ref 82–98)
MONOCYTES # BLD AUTO: 0.62 THOUSAND/ÂΜL (ref 0.17–1.22)
MONOCYTES NFR BLD AUTO: 11 % (ref 4–12)
NEUTROPHILS # BLD AUTO: 3.05 THOUSANDS/ÂΜL (ref 1.85–7.62)
NEUTS SEG NFR BLD AUTO: 52 % (ref 43–75)
NRBC BLD AUTO-RTO: 0 /100 WBCS
P AXIS: 38 DEGREES
P AXIS: 41 DEGREES
PLATELET # BLD AUTO: 288 THOUSANDS/UL (ref 149–390)
PMV BLD AUTO: 10 FL (ref 8.9–12.7)
POTASSIUM SERPL-SCNC: 3.6 MMOL/L (ref 3.5–5.3)
PR INTERVAL: 148 MS
PR INTERVAL: 150 MS
PROT SERPL-MCNC: 8 G/DL (ref 6.4–8.4)
PROTHROMBIN TIME: 14.8 SECONDS (ref 11.6–14.5)
QRS AXIS: 28 DEGREES
QRS AXIS: 31 DEGREES
QRSD INTERVAL: 90 MS
QRSD INTERVAL: 92 MS
QT INTERVAL: 326 MS
QT INTERVAL: 356 MS
QTC INTERVAL: 418 MS
QTC INTERVAL: 437 MS
RBC # BLD AUTO: 5.32 MILLION/UL (ref 3.88–5.62)
RSV RNA RESP QL NAA+PROBE: NEGATIVE
SARS-COV-2 RNA RESP QL NAA+PROBE: NEGATIVE
SODIUM SERPL-SCNC: 140 MMOL/L (ref 135–147)
T WAVE AXIS: 24 DEGREES
T WAVE AXIS: 27 DEGREES
VENTRICULAR RATE: 91 BPM
VENTRICULAR RATE: 99 BPM
WBC # BLD AUTO: 5.86 THOUSAND/UL (ref 4.31–10.16)

## 2023-09-02 PROCEDURE — 93005 ELECTROCARDIOGRAM TRACING: CPT

## 2023-09-02 PROCEDURE — G1004 CDSM NDSC: HCPCS

## 2023-09-02 PROCEDURE — 85730 THROMBOPLASTIN TIME PARTIAL: CPT | Performed by: EMERGENCY MEDICINE

## 2023-09-02 PROCEDURE — 71275 CT ANGIOGRAPHY CHEST: CPT

## 2023-09-02 PROCEDURE — 84484 ASSAY OF TROPONIN QUANT: CPT | Performed by: EMERGENCY MEDICINE

## 2023-09-02 PROCEDURE — 83880 ASSAY OF NATRIURETIC PEPTIDE: CPT

## 2023-09-02 PROCEDURE — 0241U HB NFCT DS VIR RESP RNA 4 TRGT: CPT

## 2023-09-02 PROCEDURE — 80053 COMPREHEN METABOLIC PANEL: CPT | Performed by: EMERGENCY MEDICINE

## 2023-09-02 PROCEDURE — 99285 EMERGENCY DEPT VISIT HI MDM: CPT

## 2023-09-02 PROCEDURE — 71045 X-RAY EXAM CHEST 1 VIEW: CPT

## 2023-09-02 PROCEDURE — 36415 COLL VENOUS BLD VENIPUNCTURE: CPT

## 2023-09-02 PROCEDURE — 85025 COMPLETE CBC W/AUTO DIFF WBC: CPT | Performed by: EMERGENCY MEDICINE

## 2023-09-02 PROCEDURE — 85610 PROTHROMBIN TIME: CPT | Performed by: EMERGENCY MEDICINE

## 2023-09-02 PROCEDURE — 99285 EMERGENCY DEPT VISIT HI MDM: CPT | Performed by: EMERGENCY MEDICINE

## 2023-09-02 RX ADMIN — IOHEXOL 80 ML: 350 INJECTION, SOLUTION INTRAVENOUS at 19:32

## 2023-09-02 NOTE — Clinical Note
Jean Pierre Dempsey was seen and treated in our emergency department on 9/2/2023. No restrictions            Diagnosis:     Abdoul  may return to work on return date. He may return on this date: 09/04/2023         If you have any questions or concerns, please don't hesitate to call.       Verena Closs, RN    ______________________________           _______________          _______________  Hospital Representative                              Date                                Time

## 2023-09-02 NOTE — ED PROVIDER NOTES
History  Chief Complaint   Patient presents with   • Chest Pain     Patient reporting CP (x a couple days, unsure exactly how long). Reports CP radiating to back and L arm. Reports slight SOB and dizziness earlier in the week, denies dizziness at this time. Denies N/V/D. Denies injury to chest.      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 Patient states for the past month or 2 has been having intermittent central chest pain, occasionally radiating down the left side. Has been having shortness of breath. Does not exercise much but wife thinks he has been getting slightly more winded with exertion. Patient was previously on Eliquis however due to insurance issues he has not been able to get it consistently. States that he now takes it when his legs are feeling more painful but very much not taking it all the time. Currently his legs feel okay. Prior to Admission Medications   Prescriptions Last Dose Informant Patient Reported? Taking? Eliquis 2.5 MG 9/2/2023  No Yes   Sig: Take 1 tablet by mouth twice daily      Facility-Administered Medications: None       Past Medical History:   Diagnosis Date   • H/O blood clots     Had one in 2016, then another in 2021. Past Surgical History:   Procedure Laterality Date   • TONSILLECTOMY         Family History   Problem Relation Age of Onset   • Hypertension Mother    • Diabetes type II Mother    • No Known Problems Father         Dad not in the picture   • Deep vein thrombosis Sister    • No Known Problems Sister    • No Known Problems Sister    • No Known Problems Brother    • No Known Problems Daughter      I have reviewed and agree with the history as documented.     E-Cigarette/Vaping   • E-Cigarette Use Never User      E-Cigarette/Vaping Substances     Social History     Tobacco Use   • Smoking status: Never   • Smokeless tobacco: Never   Vaping Use   • Vaping Use: Never used   Substance Use Topics   • Alcohol use: Never   • Drug use: Never        Review of Systems   Constitutional: Negative for activity change, fever and unexpected weight change. Respiratory: Positive for chest tightness and shortness of breath. Negative for cough. Cardiovascular: Positive for chest pain. Negative for palpitations and leg swelling. Gastrointestinal: Negative for abdominal pain, diarrhea, nausea and vomiting. Genitourinary: Negative for dysuria and hematuria. Skin: Negative for wound. Allergic/Immunologic: Negative for immunocompromised state. Neurological: Negative for syncope. All other systems reviewed and are negative. Physical Exam  ED Triage Vitals [09/02/23 1751]   Temperature Pulse Respirations Blood Pressure SpO2   98.7 °F (37.1 °C) 96 20 (!) 177/96 97 %      Temp Source Heart Rate Source Patient Position - Orthostatic VS BP Location FiO2 (%)   Oral Monitor Sitting Right arm --      Pain Score       7             Orthostatic Vital Signs  Vitals:    09/02/23 1904 09/02/23 2004 09/02/23 2100 09/02/23 2130   BP: 125/72 138/72 126/61 125/60   Pulse: 101 86 87 87   Patient Position - Orthostatic VS: Sitting Sitting         Physical Exam  Vitals and nursing note reviewed. Constitutional:       Appearance: He is not toxic-appearing or diaphoretic. HENT:      Head: Normocephalic and atraumatic. Right Ear: External ear normal.      Left Ear: External ear normal.      Nose: Nose normal.      Mouth/Throat:      Mouth: Mucous membranes are moist.   Eyes:      General: No scleral icterus. Extraocular Movements: Extraocular movements intact. Conjunctiva/sclera: Conjunctivae normal.   Cardiovascular:      Rate and Rhythm: Normal rate and regular rhythm. Pulses: Normal pulses. Radial pulses are 2+ on the right side. Dorsalis pedis pulses are 2+ on the right side and 2+ on the left side.       Heart sounds: Normal heart sounds, S1 normal and S2 normal. No murmur heard. Pulmonary:      Effort: Pulmonary effort is normal. No respiratory distress. Breath sounds: Normal breath sounds. No stridor. No wheezing. Chest:      Chest wall: No tenderness. Abdominal:      Palpations: Abdomen is soft. Tenderness: There is no abdominal tenderness. Musculoskeletal:         General: Normal range of motion. Cervical back: Normal range of motion. Right lower leg: No edema. Left lower leg: No edema. Skin:     General: Skin is warm and dry. Neurological:      General: No focal deficit present. Mental Status: He is alert and oriented to person, place, and time. Psychiatric:         Mood and Affect: Mood normal.         ED Medications  Medications   iohexol (OMNIPAQUE) 350 MG/ML injection (MULTI-DOSE) 80 mL (80 mL Intravenous Given 9/2/23 1932)       Diagnostic Studies  Results Reviewed     Procedure Component Value Units Date/Time    FLU/RSV/COVID - if FLU/RSV clinically relevant [[id number]]  (Normal) Collected: 09/02/23 2000    Lab Status: Final result Specimen: Nares from Nasopharyngeal Swab Updated: 09/02/23 2044     SARS-CoV-2 Negative     INFLUENZA A PCR Negative     INFLUENZA B PCR Negative     RSV PCR Negative    Narrative:      FOR PEDIATRIC PATIENTS - copy/paste COVID Guidelines URL to browser: [email or web address]    SARS-CoV-2 assay is a Nucleic Acid Amplification assay intended for the  qualitative detection of nucleic acid from SARS-CoV-2 in nasopharyngeal  swabs. Results are for the presumptive identification of SARS-CoV-2 RNA. Positive results are indicative of infection with SARS-CoV-2, the virus  causing COVID-19, but do not rule out bacterial infection or co-infection  with other viruses. Laboratories within the [facility] and its  territories are required to report all positive results to the appropriate  public health authorities.  Negative results do not preclude SARS-CoV-2  infection and should not be used as the sole basis for treatment or other  patient management decisions. Negative results must be combined with  clinical observations, patient history, and epidemiological information. This test has not been FDA cleared or approved. This test has been authorized by FDA under an Emergency Use Authorization  (EUA). This test is only authorized for the duration of time the  declaration that circumstances exist justifying the authorization of the  emergency use of an in vitro diagnostic tests for detection of SARS-CoV-2  virus and/or diagnosis of COVID-19 infection under section 564(b)(1) of  the Act, 21 U. S.C. 376BWJ-4(P)(6), unless the authorization is terminated  or revoked sooner. The test has been validated but independent review by FDA  and CLIA is pending. Test performed using [employer]: This RT-PCR assay targets N2,  a region unique to SARS-CoV-2. A conserved region in the E-gene was chosen  for pan-Sarbecovirus detection which includes SARS-CoV-2. According to CMS-2020-01-R, this platform meets the definition of high-throughput technology.     HS Troponin I 2hr [[id number]]  (Normal) Collected: 09/02/23 2000    Lab Status: Final result Specimen: Blood from Arm, Left Updated: 09/02/23 2031     hs TnI 2hr 4 ng/L      Delta 2hr hsTnI 0 ng/L     B-Type Natriuretic Peptide(BNP) [[id number]]  (Normal) Collected: 09/02/23 1756    Lab Status: Final result Specimen: Blood from Arm, Left Updated: 09/02/23 2030     BNP 1 pg/mL     HS Troponin I 4hr [[id number]]     Lab Status: No result Specimen: Blood     HS Troponin 0hr (reflex protocol) [[id number]]  (Normal) Collected: 09/02/23 1756    Lab Status: Final result Specimen: Blood from Arm, Left Updated: 09/02/23 1834     hs TnI 0hr 4 ng/L     Comprehensive metabolic panel [[id number]] Collected: 09/02/23 1756    Lab Status: Final result Specimen: Blood from Arm, Left Updated: 09/02/23 1828 Sodium 140 mmol/L      Potassium 3.6 mmol/L      Chloride 106 mmol/L      CO2 28 mmol/L      ANION GAP 6 mmol/L      BUN 11 mg/dL      Creatinine 1.00 mg/dL      Glucose 81 mg/dL      Calcium 9.3 mg/dL      AST 23 U/L      ALT 34 U/L      Alkaline Phosphatase 88 U/L      Total Protein 8.0 g/dL      Albumin 4.4 g/dL      Total Bilirubin 0.70 mg/dL      eGFR 99 ml/min/1.73sq m     Narrative:      National Kidney Disease Foundation guidelines for Chronic Kidney Disease (CKD):   •  Stage 1 with normal or high GFR (GFR > 90 mL/min/1.73 square meters)  •  Stage 2 Mild CKD (GFR = 60-89 mL/min/1.73 square meters)  •  Stage 3A Moderate CKD (GFR = 45-59 mL/min/1.73 square meters)  •  Stage 3B Moderate CKD (GFR = 30-44 mL/min/1.73 square meters)  •  Stage 4 Severe CKD (GFR = 15-29 mL/min/1.73 square meters)  •  Stage 5 End Stage CKD (GFR <15 mL/min/1.73 square meters)  Note: GFR calculation is accurate only with a steady state creatinine    APTT [[id number]]  (Normal) Collected: 09/02/23 1756    Lab Status: Final result Specimen: Blood from Arm, Left Updated: 09/02/23 1828     PTT 29 seconds     Protime-INR [[id number]]  (Abnormal) Collected: 09/02/23 1756    Lab Status: Final result Specimen: Blood from Arm, Left Updated: 09/02/23 1828     Protime 14.8 seconds      INR 1.10    CBC and differential [[id number]] Collected: 09/02/23 1756    Lab Status: Final result Specimen: Blood from Arm, Left Updated: 09/02/23 1807     WBC 5.86 Thousand/uL      RBC 5.32 Million/uL      Hemoglobin 15.8 g/dL      Hematocrit 46.8 %      MCV 88 fL      MCH 29.7 pg      MCHC 33.8 g/dL      RDW 12.7 %      MPV 10.0 fL      Platelets 769 Thousands/uL      nRBC 0 /100 WBCs      Neutrophils Relative 52 %      Immat GRANS % 0 %      Lymphocytes Relative 34 %      Monocytes Relative 11 %      Eosinophils Relative 2 %      Basophils Relative 1 %      Neutrophils Absolute 3.05 Thousands/µL      Immature Grans Absolute 0.02 Thousand/uL      Lymphocytes Absolute 2.01 Thousands/µL      Monocytes Absolute 0.62 Thousand/µL      Eosinophils Absolute 0.10 Thousand/µL      Basophils Absolute 0.06 Thousands/µL                  CTA ED chest PE study   Final Result by Carleen Quinteros MD (09/02 2039)      No acute pathology. No pulmonary embolus. Workstation performed: [id number]         XR chest 1 view portable   ED Interpretation by Kim Martin MD (09/02 1928)   Chest xray independently interpreted by me. No acute cardiopulmonary disease. No subdiaphragmatic free air. Final Result by Carleen Quinteros MD (09/02 2039)      No acute cardiopulmonary disease.                   Workstation performed: [id number]               Procedures  ECG 12 Lead Documentation Only    Date/Time: 9/2/2023 9:22 PM    Performed by: Kim Martin MD  Authorized by: Kim Martin MD    Indications / Diagnosis:  Cp  ECG reviewed by me, the ED Provider: yes    Patient location:  ED  Previous ECG:     Previous ECG:  Compared to current    Similarity:  No change    Comparison to cardiac monitor: Yes    Interpretation:     Interpretation: normal    Rate:     ECG rate:  91    ECG rate assessment: normal    Rhythm:     Rhythm: sinus rhythm    Ectopy:     Ectopy: none    QRS:     QRS axis:  Normal    QRS intervals:  Normal  Conduction:     Conduction: normal    ST segments:     ST segments:  Normal  T waves:     T waves: normal    Q waves:     Q waves:  AVL  Other findings:     Other findings: early repolarization    ECG 12 Lead Documentation Only    Date/Time: 9/2/2023 5:52 PM    Performed by: Kim Martin MD  Authorized by: Kim Martin MD    Indications / Diagnosis:  Cp  ECG reviewed by me, the ED Provider: yes    Patient location:  ED  Previous ECG:     Previous ECG:  Unavailable    Comparison to cardiac monitor: Yes    Interpretation:     Interpretation: normal    Rate:     ECG rate:  99    ECG rate assessment: normal    Rhythm:     Rhythm: sinus rhythm Ectopy:     Ectopy: none    QRS:     QRS axis:  Normal    QRS intervals:  Normal  Conduction:     Conduction: normal    ST segments:     ST segments:  Normal  T waves:     T waves: normal    POC Cardiac US    Date/Time: 9/2/2023 11:11 PM    Performed by: Cheyenne Canavan, MD  Authorized by: Cheyenne Canavan, MD    Patient location:  ED  Other Assisting Provider: No    Procedure details:     Exam Type:  Diagnostic and educational    Indications: chest pain      Assessment / Evaluation for: pericardial effusion and right heart strain (suspected pulmonary embolism)      Exam Type: initial exam      Image quality: diagnostic      Image availability:  Images available in PACS, still images obtained and video obtained  Patient Details:     Cardiac Rhythm:  Regular    Mechanical ventilation: No    Cardiac findings:     Echo technique: limited 2D      Views obtained: parasternal long axis, parasternal short axis and apical      Pericardial effusion: absent      Tamponade physiology: absent      Wall motion: normal      LV systolic function: normal      RV dilation: none            ED Course  ED Course as of 09/02/23 2314   Sat Sep 02, 2023   1807 CBC and differential  wnl   1845 hs TnI 0hr: 4   1845 Comprehensive metabolic panel  wnl   6608 XR chest 1 view portable  Chest xray independently interpreted by me. No acute cardiopulmonary disease. No subdiaphragmatic free air. 2055 Delta 2hr hsTnI: 0   2100 CTA ED chest PE study  No acute pathology. No pulmonary embolus.              HEART Risk Score    Flowsheet Row Most Recent Value   Heart Score Risk Calculator    History 1 Filed at: 09/02/2023 1845   ECG 0 Filed at: 09/02/2023 1845   Age 0 Filed at: 09/02/2023 1845   Risk Factors 1 Filed at: 09/02/2023 1845   Troponin 0 Filed at: 09/02/2023 1845   HEART Score 2 Filed at: 09/02/2023 1845              PERC Rule for PE    Flowsheet Row Most Recent Value   PERC Rule for PE    Age >=50 0 Filed at: 09/02/2023 2312   HR >=100 1 Filed at: 09/02/2023 2312   O2 Sat on room air < 95% 0 Filed at: 09/02/2023 2312   History of PE or DVT 1 Filed at: 09/02/2023 2312   Recent trauma or surgery 0 Filed at: 09/02/2023 2312   Hemoptysis 0 Filed at: 09/02/2023 2312   Exogenous estrogen 0 Filed at: 09/02/2023 2312   Unilateral leg swelling 0 Filed at: 09/02/2023 2312   [address] Rule for PE Results 2 Filed at: 09/02/2023 2312              SBIRT 22yo+    Flowsheet Row Most Recent Value   Initial Alcohol Screen: US AUDIT-C     1. How often do you have a drink containing alcohol? 0 Filed at: 09/02/2023 1750   2. How many drinks containing alcohol do you have on a typical day you are drinking? 0 Filed at: 09/02/2023 1750   3a. Male UNDER 65: How often do you have five or more drinks on one occasion? 0 Filed at: 09/02/2023 1750   3b. FEMALE Any Age, or MALE 65+: How often do you have 4 or more drinks on one occassion? 0 Filed at: 09/02/2023 1750   Audit-C Score 0 Filed at: 09/02/2023 1750   CATHI: How many times in the past year have you. .. Used an illegal drug or used a prescription medication for non-medical reasons?  Never Filed at: 09/02/2023 1750          Wells' Criteria for PE    Flowsheet Row Most Recent Value   Wells' Criteria for PE    Clinical signs and symptoms of DVT 0 Filed at: 09/02/2023 4751   PE is primary diagnosis or equally likely 3 Filed at: 09/02/2023 1824   HR >100 1.5 Filed at: 09/02/2023 1824   Immobilization at least 3 days or Surgery in the previous 4 weeks 0 Filed at: 09/02/2023 1824   Previous, objectively diagnosed PE or DVT 1.5 Filed at: 09/02/2023 1824   Hemoptysis 0 Filed at: 09/02/2023 1824   Malignancy with treatment within 6 months or palliative 0 Filed at: 09/02/2023 1824   Wells' Criteria Total 6 Filed at: 09/02/2023 1824            Medical Decision Making  Initial Impression: Chest pain with shortness of breath and tachycardic to the 100s on evaluation concern for possibility of PE especially since he is not on anticoagulation. Lower likelihood for ACS given age and no previous history. Initial Work Up: ACS evaluation including ecg/troponin/chest xray and CBC, CMP, CTA PE study    Final Impression: Evaluation here all within normal limits with no evidence of ACS or PE. Is not having any chest pain so doubt unstable angina. Still concerned that he has not been able to take Eliquis consistently given his history. Will refer him to our family medicine clinic where they can help him set up insurance and anticoagulation he will be able to afford. Antithrombin III deficiency [facility]): chronic illness or injury  Atypical chest pain: complicated acute illness or injury  Amount and/or Complexity of Data Reviewed  Independent Historian: spouse  Labs: ordered. Decision-making details documented in ED Course. Radiology: ordered and independent interpretation performed. Decision-making details documented in ED Course. ECG/medicine tests: ordered and independent interpretation performed. Risk  Prescription drug management. Disposition  Final diagnoses:   Atypical chest pain   Antithrombin III deficiency ([address])     Time reflects when diagnosis was documented in both MDM as applicable and the Disposition within this note     Time User Action Codes Description Comment    9/2/2023  9:26 PM Elester Kayser Add [R07.89] Atypical chest pain     9/2/2023  9:26 PM Elester Kayser Add [D68.59] Antithrombin III deficiency [facility])       ED Disposition     ED Disposition   Discharge    Condition   Stable    Date/Time   Sat Sep 2, 2023  9:26 PM    Comment   [place] discharge to home/self care.                Follow-up Information     Follow up With Specialties Details Why Contact Info Additional [address] Family Medicine Schedule an appointment as soon as possible for a visit   [address] [place] [phone] [address] [facility], [address] [place] [address], [facility], [address] [place] [address]   [phone]          Discharge Medication List as of 9/2/2023  9:28 PM      CONTINUE these medications which have NOT CHANGED    Details   Eliquis 2.5 MG Take 1 tablet by mouth twice daily, Normal               PDMP Review     None           ED Provider  Attending physically available and evaluated Linnette Marcum. I managed the patient along with the ED Attending.     Electronically Signed by         Leda Tate MD  09/02/23 [id number]

## 2023-09-05 LAB
ATRIAL RATE: 91 BPM
ATRIAL RATE: 99 BPM
P AXIS: 38 DEGREES
P AXIS: 41 DEGREES
PR INTERVAL: 148 MS
PR INTERVAL: 150 MS
QRS AXIS: 28 DEGREES
QRS AXIS: 31 DEGREES
QRSD INTERVAL: 90 MS
QRSD INTERVAL: 92 MS
QT INTERVAL: 326 MS
QT INTERVAL: 356 MS
QTC INTERVAL: 418 MS
QTC INTERVAL: 437 MS
T WAVE AXIS: 24 DEGREES
T WAVE AXIS: 27 DEGREES
VENTRICULAR RATE: 91 BPM
VENTRICULAR RATE: 99 BPM

## 2023-09-05 PROCEDURE — 93010 ELECTROCARDIOGRAM REPORT: CPT | Performed by: INTERNAL MEDICINE

## 2023-09-08 NOTE — ED ATTENDING ATTESTATION
9/2/2023  I, Riley Hanna DO, saw and evaluated the patient. I have discussed the patient with the resident/non-physician practitioner and agree with the resident's/non-physician practitioner's findings, Plan of Care, and MDM as documented in the resident's/non-physician practitioner's note, except where noted. All available labs and Radiology studies were reviewed. I was present for key portions of any procedure(s) performed by the resident/non-physician practitioner and I was immediately available to provide assistance. At this point I agree with the current assessment done in the Emergency Department.   I have conducted an independent evaluation of this patient a history and physical is as follows:        68-year-old male, chest pain, short of breath, tachycardic, history of PE, high concern for PE, nontoxic in appearance on initial exam, has not been taking Eliquis as previously prescribed due to financial constraints, CT performed in ED thankfully unremarkable patient to follow with outpatient family medicine clinic      ED Course         Critical Care Time  Procedures

## 2023-09-21 ENCOUNTER — HOSPITAL ENCOUNTER (EMERGENCY)
Facility: HOSPITAL | Age: 33
Discharge: HOME | End: 2023-09-22
Attending: EMERGENCY MEDICINE

## 2023-09-21 ENCOUNTER — APPOINTMENT (EMERGENCY)
Dept: CT IMAGING | Facility: HOSPITAL | Age: 33
End: 2023-09-21

## 2023-09-21 DIAGNOSIS — R31.9 HEMATURIA: ICD-10-CM

## 2023-09-21 DIAGNOSIS — N13.5 URETEROVESICAL JUNCTION (UVJ) OBSTRUCTION: Primary | ICD-10-CM

## 2023-09-21 LAB
ALBUMIN SERPL BCP-MCNC: 4.4 G/DL (ref 3.5–5)
ALP SERPL-CCNC: 87 U/L (ref 34–104)
ALT SERPL W P-5'-P-CCNC: 33 U/L (ref 7–52)
ANION GAP SERPL CALCULATED.3IONS-SCNC: 6 MMOL/L
APTT PPP: 26 SECONDS (ref 23–37)
AST SERPL W P-5'-P-CCNC: 23 U/L (ref 13–39)
BACTERIA UR QL AUTO: ABNORMAL /HPF
BASOPHILS # BLD AUTO: 0.04 THOUSANDS/ÂΜL (ref 0–0.1)
BASOPHILS NFR BLD AUTO: 1 % (ref 0–1)
BILIRUB SERPL-MCNC: 0.72 MG/DL (ref 0.2–1)
BILIRUB UR QL STRIP: NEGATIVE
BUN SERPL-MCNC: 11 MG/DL (ref 5–25)
CALCIUM SERPL-MCNC: 9.1 MG/DL (ref 8.4–10.2)
CHLORIDE SERPL-SCNC: 107 MMOL/L (ref 96–108)
CLARITY UR: CLEAR
CO2 SERPL-SCNC: 27 MMOL/L (ref 21–32)
COLOR UR: YELLOW
CREAT SERPL-MCNC: 1.07 MG/DL (ref 0.6–1.3)
EOSINOPHIL # BLD AUTO: 0.12 THOUSAND/ÂΜL (ref 0–0.61)
EOSINOPHIL NFR BLD AUTO: 2 % (ref 0–6)
ERYTHROCYTE [DISTWIDTH] IN BLOOD BY AUTOMATED COUNT: 12.9 % (ref 11.6–15.1)
GFR SERPL CREATININE-BSD FRML MDRD: 92 ML/MIN/1.73SQ M
GLUCOSE SERPL-MCNC: 136 MG/DL (ref 65–140)
GLUCOSE UR STRIP-MCNC: NEGATIVE MG/DL
HCT VFR BLD AUTO: 45 % (ref 36.5–49.3)
HGB BLD-MCNC: 15 G/DL (ref 12–17)
HGB UR QL STRIP.AUTO: ABNORMAL
IMM GRANULOCYTES # BLD AUTO: 0.02 THOUSAND/UL (ref 0–0.2)
IMM GRANULOCYTES NFR BLD AUTO: 0 % (ref 0–2)
INR PPP: 1.08 (ref 0.84–1.19)
KETONES UR STRIP-MCNC: ABNORMAL MG/DL
LEUKOCYTE ESTERASE UR QL STRIP: NEGATIVE
LIPASE SERPL-CCNC: 32 U/L (ref 11–82)
LYMPHOCYTES # BLD AUTO: 1.66 THOUSANDS/ÂΜL (ref 0.6–4.47)
LYMPHOCYTES NFR BLD AUTO: 28 % (ref 14–44)
MCH RBC QN AUTO: 29.6 PG (ref 26.8–34.3)
MCHC RBC AUTO-ENTMCNC: 33.3 G/DL (ref 31.4–37.4)
MCV RBC AUTO: 89 FL (ref 82–98)
MONOCYTES # BLD AUTO: 0.45 THOUSAND/ÂΜL (ref 0.17–1.22)
MONOCYTES NFR BLD AUTO: 8 % (ref 4–12)
MUCOUS THREADS UR QL AUTO: ABNORMAL
NEUTROPHILS # BLD AUTO: 3.6 THOUSANDS/ÂΜL (ref 1.85–7.62)
NEUTS SEG NFR BLD AUTO: 61 % (ref 43–75)
NITRITE UR QL STRIP: NEGATIVE
NON-SQ EPI CELLS URNS QL MICRO: ABNORMAL /HPF
NRBC BLD AUTO-RTO: 0 /100 WBCS
PH UR STRIP.AUTO: 5.5 [PH]
PLATELET # BLD AUTO: 277 THOUSANDS/UL (ref 149–390)
PMV BLD AUTO: 10.1 FL (ref 8.9–12.7)
POTASSIUM SERPL-SCNC: 3.7 MMOL/L (ref 3.5–5.3)
PROT SERPL-MCNC: 7.9 G/DL (ref 6.4–8.4)
PROT UR STRIP-MCNC: ABNORMAL MG/DL
PROTHROMBIN TIME: 14.6 SECONDS (ref 11.6–14.5)
RBC # BLD AUTO: 5.06 MILLION/UL (ref 3.88–5.62)
RBC #/AREA URNS AUTO: ABNORMAL /HPF
SODIUM SERPL-SCNC: 140 MMOL/L (ref 135–147)
SP GR UR STRIP.AUTO: 1.02 (ref 1–1.03)
UROBILINOGEN UR STRIP-ACNC: <2 MG/DL
WBC # BLD AUTO: 5.89 THOUSAND/UL (ref 4.31–10.16)
WBC #/AREA URNS AUTO: ABNORMAL /HPF

## 2023-09-21 PROCEDURE — 85025 COMPLETE CBC W/AUTO DIFF WBC: CPT | Performed by: PHYSICIAN ASSISTANT

## 2023-09-21 PROCEDURE — 85730 THROMBOPLASTIN TIME PARTIAL: CPT | Performed by: PHYSICIAN ASSISTANT

## 2023-09-21 PROCEDURE — 36415 COLL VENOUS BLD VENIPUNCTURE: CPT | Performed by: PHYSICIAN ASSISTANT

## 2023-09-21 PROCEDURE — 99284 EMERGENCY DEPT VISIT MOD MDM: CPT

## 2023-09-21 PROCEDURE — 80053 COMPREHEN METABOLIC PANEL: CPT | Performed by: PHYSICIAN ASSISTANT

## 2023-09-21 PROCEDURE — 99284 EMERGENCY DEPT VISIT MOD MDM: CPT | Performed by: PHYSICIAN ASSISTANT

## 2023-09-21 PROCEDURE — 81001 URINALYSIS AUTO W/SCOPE: CPT | Performed by: PHYSICIAN ASSISTANT

## 2023-09-21 PROCEDURE — 83690 ASSAY OF LIPASE: CPT | Performed by: PHYSICIAN ASSISTANT

## 2023-09-21 PROCEDURE — 85610 PROTHROMBIN TIME: CPT | Performed by: PHYSICIAN ASSISTANT

## 2023-09-21 PROCEDURE — G1004 CDSM NDSC: HCPCS

## 2023-09-21 PROCEDURE — 96360 HYDRATION IV INFUSION INIT: CPT

## 2023-09-21 PROCEDURE — 96361 HYDRATE IV INFUSION ADD-ON: CPT

## 2023-09-21 PROCEDURE — 74176 CT ABD & PELVIS W/O CONTRAST: CPT

## 2023-09-21 RX ORDER — TAMSULOSIN HYDROCHLORIDE 0.4 MG/1
0.4 CAPSULE ORAL
Qty: 2 CAPSULE | Refills: 0 | Status: SHIPPED | OUTPATIENT
Start: 2023-09-21 | End: 2023-09-23

## 2023-09-21 RX ORDER — OXYCODONE HYDROCHLORIDE 5 MG/1
5 TABLET ORAL EVERY 6 HOURS PRN
Qty: 8 TABLET | Refills: 0 | Status: SHIPPED | OUTPATIENT
Start: 2023-09-21 | End: 2023-09-23

## 2023-09-21 RX ORDER — ONDANSETRON 4 MG/1
4 TABLET, FILM COATED ORAL EVERY 8 HOURS PRN
Qty: 6 TABLET | Refills: 0 | Status: SHIPPED | OUTPATIENT
Start: 2023-09-21 | End: 2023-09-23

## 2023-09-21 RX ORDER — TAMSULOSIN HYDROCHLORIDE 0.4 MG/1
0.4 CAPSULE ORAL ONCE
Status: COMPLETED | OUTPATIENT
Start: 2023-09-21 | End: 2023-09-22

## 2023-09-21 RX ORDER — ONDANSETRON 2 MG/ML
4 INJECTION INTRAMUSCULAR; INTRAVENOUS ONCE
Status: DISCONTINUED | OUTPATIENT
Start: 2023-09-21 | End: 2023-09-22 | Stop reason: HOSPADM

## 2023-09-21 RX ORDER — KETOROLAC TROMETHAMINE 30 MG/ML
15 INJECTION, SOLUTION INTRAMUSCULAR; INTRAVENOUS ONCE
Status: DISCONTINUED | OUTPATIENT
Start: 2023-09-21 | End: 2023-09-22 | Stop reason: HOSPADM

## 2023-09-21 RX ADMIN — SODIUM CHLORIDE 1000 ML: 0.9 INJECTION, SOLUTION INTRAVENOUS at 22:24

## 2023-09-21 RX ADMIN — SODIUM CHLORIDE 1000 ML: 0.9 INJECTION, SOLUTION INTRAVENOUS at 21:24

## 2023-09-21 NOTE — Clinical Note
Trinh Frazier was seen and treated in our emergency department on 9/21/2023. Diagnosis:     Abdoul  . He may return on this date: 09/23/2023         If you have any questions or concerns, please don't hesitate to call.       Lluvia Williamson PA-C    ______________________________           _______________          _______________  Hospital Representative                              Date                                Time

## 2023-09-22 VITALS
DIASTOLIC BLOOD PRESSURE: 71 MMHG | SYSTOLIC BLOOD PRESSURE: 134 MMHG | HEART RATE: 82 BPM | TEMPERATURE: 97.9 F | OXYGEN SATURATION: 98 % | RESPIRATION RATE: 16 BRPM

## 2023-09-22 RX ADMIN — TAMSULOSIN HYDROCHLORIDE 0.4 MG: 0.4 CAPSULE ORAL at 00:06

## 2023-09-22 NOTE — ED PROVIDER NOTES
History  Chief Complaint   Patient presents with   • Flank Pain     C/o left flank pain since yesterday with worsening pain tonight. Reports frequent urination, pain with urination. -N/V. Patient is a 45-year-old male with history of DVT presents emerged apartment with dull aching persistent left flank pain with radiation to left lower quadrant of abdomen for 1 day. Patient has associate symptomatology of decreased urinary stream beginning with the current ED presentation of left flank pain symptoms. Patient does report a remote history of kidney stones and reports that current left flank pain symptoms resemble those previous experience. Patient denies testicular pain. Patient denies palliative factors with provocative factors of urination and pressure to left flank. Patient has noneffective treatment. Patient denies fevers, chills, nausea, vomiting, diarrhea and constipation. Patient denies recent fall recent trauma. Patient denies contacts recent travel. Patient denies chest pain and shortness of breath. History provided by:  Patient   used: No    Flank Pain  Associated symptoms: no chest pain, no chills, no constipation, no cough, no diarrhea, no dysuria, no fever, no nausea, no shortness of breath, no sore throat and no vomiting        Prior to Admission Medications   Prescriptions Last Dose Informant Patient Reported? Taking? Eliquis 2.5 MG   No No   Sig: Take 1 tablet by mouth twice daily      Facility-Administered Medications: None       Past Medical History:   Diagnosis Date   • H/O blood clots     Had one in 2016, then another in 2021.        Past Surgical History:   Procedure Laterality Date   • TONSILLECTOMY         Family History   Problem Relation Age of Onset   • Hypertension Mother    • Diabetes type II Mother    • No Known Problems Father         Dad not in the picture   • Deep vein thrombosis Sister    • No Known Problems Sister    • No Known Problems Sister • No Known Problems Brother    • No Known Problems Daughter      I have reviewed and agree with the history as documented. E-Cigarette/Vaping   • E-Cigarette Use Never User      E-Cigarette/Vaping Substances     Social History     Tobacco Use   • Smoking status: Never   • Smokeless tobacco: Never   Vaping Use   • Vaping Use: Never used   Substance Use Topics   • Alcohol use: Never   • Drug use: Never       Review of Systems   Constitutional: Negative for activity change, appetite change, chills and fever. HENT: Negative for congestion, postnasal drip, rhinorrhea, sinus pressure, sinus pain, sore throat and tinnitus. Eyes: Negative for photophobia and visual disturbance. Respiratory: Negative for cough, chest tightness and shortness of breath. Cardiovascular: Negative for chest pain and palpitations. Gastrointestinal: Negative for abdominal pain, constipation, diarrhea, nausea and vomiting. Genitourinary: Positive for difficulty urinating and flank pain. Negative for dysuria, frequency and urgency. Musculoskeletal: Negative for back pain, gait problem, neck pain and neck stiffness. Skin: Negative for pallor and rash. Allergic/Immunologic: Negative for environmental allergies and food allergies. Neurological: Negative for dizziness, weakness, numbness and headaches. Psychiatric/Behavioral: Negative for confusion. All other systems reviewed and are negative. Physical Exam  Physical Exam  Vitals and nursing note reviewed. Constitutional:       General: He is awake. Appearance: Normal appearance. He is well-developed. He is not ill-appearing, toxic-appearing or diaphoretic. Comments: /74 (BP Location: Right arm)   Pulse 99   Temp 97.9 °F (36.6 °C) (Oral)   Resp 19   SpO2 98%      HENT:      Head: Normocephalic and atraumatic. Right Ear: Hearing and external ear normal. No decreased hearing noted. No drainage, swelling or tenderness. No mastoid tenderness. Left Ear: Hearing and external ear normal. No decreased hearing noted. No drainage, swelling or tenderness. No mastoid tenderness. Nose: Nose normal.      Mouth/Throat:      Lips: Pink. Mouth: Mucous membranes are moist.      Pharynx: Oropharynx is clear. Uvula midline. Eyes:      General: Lids are normal. Vision grossly intact. Right eye: No discharge. Left eye: No discharge. Extraocular Movements: Extraocular movements intact. Conjunctiva/sclera: Conjunctivae normal.      Pupils: Pupils are equal, round, and reactive to light. Neck:      Vascular: No JVD. Trachea: Trachea and phonation normal. No tracheal tenderness or tracheal deviation. Cardiovascular:      Rate and Rhythm: Normal rate and regular rhythm. Pulses: Normal pulses. Radial pulses are 2+ on the right side and 2+ on the left side. Posterior tibial pulses are 2+ on the right side and 2+ on the left side. Heart sounds: Normal heart sounds. Pulmonary:      Effort: Pulmonary effort is normal.      Breath sounds: Normal breath sounds. No stridor. No decreased breath sounds, wheezing, rhonchi or rales. Chest:      Chest wall: No tenderness. Abdominal:      General: Abdomen is flat. Bowel sounds are normal. There is no distension. Palpations: Abdomen is soft. Abdomen is not rigid. Tenderness: There is abdominal tenderness in the left lower quadrant. There is left CVA tenderness. There is no guarding or rebound. Musculoskeletal:         General: Normal range of motion. Cervical back: Full passive range of motion without pain, normal range of motion and neck supple. No rigidity. No spinous process tenderness or muscular tenderness. Normal range of motion. Lymphadenopathy:      Head:      Right side of head: No submental, submandibular, tonsillar, preauricular, posterior auricular or occipital adenopathy.       Left side of head: No submental, submandibular, tonsillar, preauricular, posterior auricular or occipital adenopathy. Cervical: No cervical adenopathy. Right cervical: No superficial, deep or posterior cervical adenopathy. Left cervical: No superficial, deep or posterior cervical adenopathy. Skin:     General: Skin is warm. Capillary Refill: Capillary refill takes less than 2 seconds. Neurological:      General: No focal deficit present. Mental Status: He is alert and oriented to person, place, and time. GCS: GCS eye subscore is 4. GCS verbal subscore is 5. GCS motor subscore is 6. Sensory: No sensory deficit. Psychiatric:         Mood and Affect: Mood normal.         Speech: Speech normal.         Behavior: Behavior normal. Behavior is cooperative. Thought Content:  Thought content normal.         Judgment: Judgment normal.         Vital Signs  ED Triage Vitals   Temperature Pulse Respirations Blood Pressure SpO2   09/21/23 2049 09/21/23 2049 09/21/23 2049 09/21/23 2049 09/21/23 2049   97.9 °F (36.6 °C) (!) 106 19 141/74 98 %      Temp Source Heart Rate Source Patient Position - Orthostatic VS BP Location FiO2 (%)   09/21/23 2049 09/21/23 2049 09/21/23 2049 09/21/23 2049 --   Oral Monitor Sitting Right arm       Pain Score       09/22/23 0008       No Pain           Vitals:    09/21/23 2049 09/21/23 2150 09/22/23 0008   BP: 141/74  134/71   Pulse: (!) 106 99 82   Patient Position - Orthostatic VS: Sitting  Lying         Visual Acuity      ED Medications  Medications   sodium chloride 0.9 % bolus 1,000 mL (0 mL Intravenous Stopped 9/21/23 2224)   sodium chloride 0.9 % bolus 1,000 mL (0 mL Intravenous Stopped 9/22/23 0033)   tamsulosin (FLOMAX) capsule 0.4 mg (0.4 mg Oral Given 9/22/23 0006)       Diagnostic Studies  Results Reviewed     Procedure Component Value Units Date/Time    Protime-INR [[id number]]  (Abnormal) Collected: 09/21/23 2126    Lab Status: Final result Specimen: Blood from Arm, Left Updated: 09/21/23 2155     Protime 14.6 seconds      INR 1.08    APTT [[id number]]  (Normal) Collected: 09/21/23 2126    Lab Status: Final result Specimen: Blood from Arm, Left Updated: 09/21/23 2155     PTT 26 seconds     Lipase [[id number]]  (Normal) Collected: 09/21/23 2126    Lab Status: Final result Specimen: Blood from Arm, Left Updated: 09/21/23 2155     Lipase 32 u/L     Comprehensive metabolic panel [[id number]] Collected: 09/21/23 2126    Lab Status: Final result Specimen: Blood from Arm, Left Updated: 09/21/23 2155     Sodium 140 mmol/L      Potassium 3.7 mmol/L      Chloride 107 mmol/L      CO2 27 mmol/L      ANION GAP 6 mmol/L      BUN 11 mg/dL      Creatinine 1.07 mg/dL      Glucose 136 mg/dL      Calcium 9.1 mg/dL      AST 23 U/L      ALT 33 U/L      Alkaline Phosphatase 87 U/L      Total Protein 7.9 g/dL      Albumin 4.4 g/dL      Total Bilirubin 0.72 mg/dL      eGFR 92 ml/min/1.73sq m     Narrative:      [place] guidelines for Chronic Kidney Disease (CKD):   •  Stage 1 with normal or high GFR (GFR > 90 mL/min/1.73 square meters)  •  Stage 2 Mild CKD (GFR = 60-89 mL/min/1.73 square meters)  •  Stage 3A Moderate CKD (GFR = 45-59 mL/min/1.73 square meters)  •  Stage 3B Moderate CKD (GFR = 30-44 mL/min/1.73 square meters)  •  Stage 4 Severe CKD (GFR = 15-29 mL/min/1.73 square meters)  •  Stage 5 End Stage CKD (GFR <15 mL/min/1.73 square meters)  Note: GFR calculation is accurate only with a steady state creatinine    Urine Microscopic [[id number]]  (Abnormal) Collected: 09/21/23 2126    Lab Status: Final result Specimen: Urine, Clean Catch Updated: 09/21/23 2147     RBC, UA Innumerable /hpf      WBC, UA 2-4 /hpf      Epithelial Cells Occasional /hpf      Bacteria, UA None Seen /hpf      MUCUS THREADS Occasional    UA w Reflex to Microscopic w Reflex to Culture [[id number]]  (Abnormal) Collected: 09/21/23 2126    Lab Status: Final result Specimen: Urine, Clean Catch Updated: 09/21/23 2140     Color, UA Yellow     Clarity, UA Clear     Specific Gravity, UA 1.023     pH, UA 5.5     Leukocytes, UA Negative     Nitrite, UA Negative     Protein, UA Trace mg/dl      Glucose, UA Negative mg/dl      Ketones, UA Trace mg/dl      Urobilinogen, UA <2.0 mg/dl      Bilirubin, UA Negative     Occult Blood, UA Large    CBC and differential [[id number]] Collected: 09/21/23 2126    Lab Status: Final result Specimen: Blood from Arm, Left Updated: 09/21/23 2137     WBC 5.89 Thousand/uL      RBC 5.06 Million/uL      Hemoglobin 15.0 g/dL      Hematocrit 45.0 %      MCV 89 fL      MCH 29.6 pg      MCHC 33.3 g/dL      RDW 12.9 %      MPV 10.1 fL      Platelets 744 Thousands/uL      nRBC 0 /100 WBCs      Neutrophils Relative 61 %      Immat GRANS % 0 %      Lymphocytes Relative 28 %      Monocytes Relative 8 %      Eosinophils Relative 2 %      Basophils Relative 1 %      Neutrophils Absolute 3.60 Thousands/µL      Immature Grans Absolute 0.02 Thousand/uL      Lymphocytes Absolute 1.66 Thousands/µL      Monocytes Absolute 0.45 Thousand/µL      Eosinophils Absolute 0.12 Thousand/µL      Basophils Absolute 0.04 Thousands/µL                  CT renal stone study abdomen pelvis without contrast   ED Interpretation by Azra De León PA-C (09/21 2323)   Thierno Hammonds MD  [phone] 9/21/2023     Narrative & Impression  CT ABDOMEN AND PELVIS WITHOUT IV CONTRAST - LOW DOSE RENAL STONE     INDICATION:   Flank pain, kidney stone suspected  left flank pain with radiation to left lower quadrant of abdomen.        COMPARISON:  None.     TECHNIQUE:  Low radiation dose thin section CT examination of the abdomen and pelvis was performed without intravenous or oral contrast according to a protocol specifically designed to evaluate for urinary tract calculus. Axial, sagittal, and coronal 2D   reformatted images were created from the source data and submitted for interpretation.   Evaluation for pathology in the abdomen and pelvis that is unrelated to urinary tract calculi is limited.     Radiation dose length product (DLP) for this visit:  457 mGy-cm . This examination, like all CT scans performed in the [facility], was performed utilizing techniques to minimize radiation dose exposure, including the use of iterative   reconst   ruction and automated exposure control.     URINARY TRACT FINDINGS:     RIGHT KIDNEY AND URETER:  No urinary tract calculi. No hydronephrosis or hydroureter.     LEFT KIDNEY AND URETER: 3 mm obstructing stone the left UVJ resulting in subtle left hydroureteronephrosis.     URINARY BLADDER:  Unremarkable.        ADDITIONAL FINDINGS:     LOWER CHEST:  No clinically significant abnormality identified in the visualized lower chest.     SOLID VISCERA: Visualized portion of the liver appears to be diffusely decreased in density suggestive of steatosis. Otherwise, limited low radiation dose CT demonstrates no clinically significant abnormality of visualized solid abdominal viscera.     GALLBLADDER/BILIARY TREE:  No calcified gallstones. No pericholecystic inflammatory change. No biliary dilatation.     STOMACH AND BOWEL: There is colonic diverticulosis without evidence of acute diverticulitis.     APPENDIX: A normal appendix was visualized.     ABDOMINOPELVIC CAVITY:  No ascites. No pneu   moperitoneum. No lymphadenopathy.     VESSELS: Unremarkable for patient's age.     REPRODUCTIVE ORGANS:  Unremarkable for patient's age.     ABDOMINAL WALL/INGUINAL REGIONS:  Unremarkable.     OSSEOUS STRUCTURES:  No acute fracture or destructive osseous lesion.     IMPRESSION:     3 mm obstructing stone the left UVJ resulting in subtle left hydroureteronephrosis.             Workstation performed: [id number]        Final Result by Rubens Sierra MD (09/21 2311)      3 mm obstructing stone the left UVJ resulting in subtle left hydroureteronephrosis.                Workstation performed: [id number]                    Procedures  Procedures ED Course                                             Medical Decision Making  Patient is a 81-year-old male with history of DVT presents emerged apartment with dull aching persistent left flank pain with radiation to left lower quadrant of abdomen for 1 day. Patient has associate symptomatology of decreased urinary stream beginning with the current ED presentation of left flank pain symptoms. Hemodynamically stable and afebrile  CT renal stone study demonstrates a 3 mm obstructing stone at the left UVJ resulting in subtle left hydroureteronephrosis  No evidence of MARY ANN, normal electrolytes  Urinalysis with hematuria  flomax and multimodal pain control  Prescribe Zofran, oxycodone, and Flomax and constipation medication ministration and side effect  Follow-up with urology, PCP in the emergency department should symptoms persist or exacerbate  Patient demonstrates verbal understanding of all clinical laboratory and imaging findings, discharge instructions follow-up and verbalized agreement with patient current treatment plan. Amount and/or Complexity of Data Reviewed  Labs: ordered. Radiology: ordered. Risk  Prescription drug management. Disposition  Final diagnoses:   Ureterovesical junction (UVJ) obstruction   Hematuria     Time reflects when diagnosis was documented in both MDM as applicable and the Disposition within this note     Time User Action Codes Description Comment    9/21/2023 11:25 PM Tatyana Pozo Add [N13.5] Ureterovesical junction (UVJ) obstruction     9/21/2023 11:25 PM Tatyana Pozo Add [R31.9] Hematuria       ED Disposition     ED Disposition   Discharge    Condition   Stable    Date/Time   Thu Sep 21, 2023 11:41 PM    Comment   Raf Rubalcava discharge to home/self care.                Follow-up Information     Follow up With Specialties Details Why Contact Info Additional [address]udh Moncada  [address] [facility], [facility], [place] [facility] [place], [place], [address]    [address] [facility]   [address]  [address] [facility], [place], [place] [facility] [place], [place], [address] [facility] [place] [facility] [place] [facility]   [address]  [address] [facility] [id number] [address] [facility] [place] [facility] [place], [address] [place] [address] [facility] [place], [place], [address] [place]          Discharge Medication List as of 9/21/2023 11:49 PM      START taking these medications    Details   ondansetron (ZOFRAN) 4 mg tablet Take 1 tablet (4 mg total) by mouth every 8 (eight) hours as needed for nausea or vomiting for up to 2 days, Starting Thu 9/21/2023, Until Sat 9/23/2023 at 2359, Normal      oxyCODONE (ROXICODONE) 5 immediate release tablet Take 1 tablet (5 mg total) by mouth every 6 (six) hours as needed for moderate pain for up to 2 days Max Daily Amount: 20 mg, Starting Thu 9/21/2023, Until Sat 9/23/2023 at 2359, Normal      tamsulosin (FLOMAX) 0.4 mg Take 1 capsule (0.4 mg total) by mouth daily with dinner for 2 days, Starting Thu 9/21/2023, Until Sat 9/23/2023, Normal         CONTINUE these medications which have NOT CHANGED    Details   Eliquis 2.5 MG Take 1 tablet by mouth twice daily, Normal             No discharge procedures on file.     PDMP Review       Value Time User    PDMP Reviewed  Yes 9/21/2023 11:45 PM Bryce Vidales PA-C          ED Provider  Electronically Signed by           Bryce Vidales PA-C  09/22/23 1869       Bryce Vidales PA-C  09/22/23 2480

## 2023-09-22 NOTE — ED NOTES
Pt discharge instructions reviewed, Pt has no further questions at this time. Pt awake and alert, no signs of acute distress noted.        Darek Church RN  09/22/23 2350

## 2023-09-22 NOTE — DISCHARGE INSTRUCTIONS
Nessa Mccall MD  [phone] 9/21/2023      Narrative & Impression  CT ABDOMEN AND PELVIS WITHOUT IV CONTRAST - LOW DOSE RENAL STONE     INDICATION:   Flank pain, kidney stone suspected  left flank pain with radiation to left lower quadrant of abdomen. COMPARISON:  None. TECHNIQUE:  Low radiation dose thin section CT examination of the abdomen and pelvis was performed without intravenous or oral contrast according to a protocol specifically designed to evaluate for urinary tract calculus. Axial, sagittal, and coronal 2D   reformatted images were created from the source data and submitted for interpretation. Evaluation for pathology in the abdomen and pelvis that is unrelated to urinary tract calculi is limited. Radiation dose length product (DLP) for this visit:  457 mGy-cm . This examination, like all CT scans performed in the [facility], was performed utilizing techniques to minimize radiation dose exposure, including the use of iterative   reconstruction and automated exposure control. URINARY TRACT FINDINGS:     RIGHT KIDNEY AND URETER:  No urinary tract calculi. No hydronephrosis or hydroureter. LEFT KIDNEY AND URETER: 3 mm obstructing stone the left UVJ resulting in subtle left hydroureteronephrosis. URINARY BLADDER:  Unremarkable. ADDITIONAL FINDINGS:     LOWER CHEST:  No clinically significant abnormality identified in the visualized lower chest.     SOLID VISCERA: Visualized portion of the liver appears to be diffusely decreased in density suggestive of steatosis. Otherwise, limited low radiation dose CT demonstrates no clinically significant abnormality of visualized solid abdominal viscera. GALLBLADDER/BILIARY TREE:  No calcified gallstones. No pericholecystic inflammatory change. No biliary dilatation. STOMACH AND BOWEL: There is colonic diverticulosis without evidence of acute diverticulitis.      APPENDIX: A normal appendix was visualized. ABDOMINOPELVIC CAVITY:  No ascites. No pneumoperitoneum. No lymphadenopathy. VESSELS: Unremarkable for patient's age. REPRODUCTIVE ORGANS:  Unremarkable for patient's age. ABDOMINAL WALL/INGUINAL REGIONS:  Unremarkable. OSSEOUS STRUCTURES:  No acute fracture or destructive osseous lesion. IMPRESSION:     3 mm obstructing stone the left UVJ resulting in subtle left hydroureteronephrosis.               Workstation performed: [id number]

## 2023-09-23 ENCOUNTER — APPOINTMENT (EMERGENCY)
Dept: ULTRASOUND IMAGING | Facility: HOSPITAL | Age: 33
End: 2023-09-23
Payer: COMMERCIAL

## 2023-09-23 ENCOUNTER — HOSPITAL ENCOUNTER (EMERGENCY)
Facility: HOSPITAL | Age: 33
Discharge: HOME | End: 2023-09-23
Attending: EMERGENCY MEDICINE
Payer: COMMERCIAL

## 2023-09-23 VITALS
DIASTOLIC BLOOD PRESSURE: 67 MMHG | OXYGEN SATURATION: 96 % | HEART RATE: 95 BPM | SYSTOLIC BLOOD PRESSURE: 139 MMHG | RESPIRATION RATE: 18 BRPM | TEMPERATURE: 98.4 F

## 2023-09-23 DIAGNOSIS — N20.0 KIDNEY STONE: ICD-10-CM

## 2023-09-23 DIAGNOSIS — R10.9 FLANK PAIN: Primary | ICD-10-CM

## 2023-09-23 LAB
ANION GAP SERPL CALCULATED.3IONS-SCNC: 7 MMOL/L
BACTERIA UR QL AUTO: ABNORMAL /HPF
BASOPHILS # BLD AUTO: 0.04 THOUSANDS/ÂΜL (ref 0–0.1)
BASOPHILS NFR BLD AUTO: 1 % (ref 0–1)
BILIRUB UR QL STRIP: NEGATIVE
BUN SERPL-MCNC: 11 MG/DL (ref 5–25)
CALCIUM SERPL-MCNC: 9.6 MG/DL (ref 8.4–10.2)
CHLORIDE SERPL-SCNC: 106 MMOL/L (ref 96–108)
CLARITY UR: CLEAR
CO2 SERPL-SCNC: 27 MMOL/L (ref 21–32)
COLOR UR: ABNORMAL
CREAT SERPL-MCNC: 1.02 MG/DL (ref 0.6–1.3)
EOSINOPHIL # BLD AUTO: 0.1 THOUSAND/ÂΜL (ref 0–0.61)
EOSINOPHIL NFR BLD AUTO: 2 % (ref 0–6)
ERYTHROCYTE [DISTWIDTH] IN BLOOD BY AUTOMATED COUNT: 12.6 % (ref 11.6–15.1)
GFR SERPL CREATININE-BSD FRML MDRD: 97 ML/MIN/1.73SQ M
GLUCOSE SERPL-MCNC: 99 MG/DL (ref 65–140)
GLUCOSE UR STRIP-MCNC: NEGATIVE MG/DL
HCT VFR BLD AUTO: 46.8 % (ref 36.5–49.3)
HGB BLD-MCNC: 16 G/DL (ref 12–17)
HGB UR QL STRIP.AUTO: ABNORMAL
IMM GRANULOCYTES # BLD AUTO: 0.03 THOUSAND/UL (ref 0–0.2)
IMM GRANULOCYTES NFR BLD AUTO: 1 % (ref 0–2)
KETONES UR STRIP-MCNC: NEGATIVE MG/DL
LEUKOCYTE ESTERASE UR QL STRIP: NEGATIVE
LYMPHOCYTES # BLD AUTO: 1.27 THOUSANDS/ÂΜL (ref 0.6–4.47)
LYMPHOCYTES NFR BLD AUTO: 21 % (ref 14–44)
MCH RBC QN AUTO: 30 PG (ref 26.8–34.3)
MCHC RBC AUTO-ENTMCNC: 34.2 G/DL (ref 31.4–37.4)
MCV RBC AUTO: 88 FL (ref 82–98)
MONOCYTES # BLD AUTO: 0.49 THOUSAND/ÂΜL (ref 0.17–1.22)
MONOCYTES NFR BLD AUTO: 8 % (ref 4–12)
MUCOUS THREADS UR QL AUTO: ABNORMAL
NEUTROPHILS # BLD AUTO: 4.2 THOUSANDS/ÂΜL (ref 1.85–7.62)
NEUTS SEG NFR BLD AUTO: 67 % (ref 43–75)
NITRITE UR QL STRIP: NEGATIVE
NON-SQ EPI CELLS URNS QL MICRO: ABNORMAL /HPF
NRBC BLD AUTO-RTO: 0 /100 WBCS
PH UR STRIP.AUTO: 5.5 [PH]
PLATELET # BLD AUTO: 291 THOUSANDS/UL (ref 149–390)
PMV BLD AUTO: 9.8 FL (ref 8.9–12.7)
POTASSIUM SERPL-SCNC: 3.8 MMOL/L (ref 3.5–5.3)
PROT UR STRIP-MCNC: NEGATIVE MG/DL
RBC # BLD AUTO: 5.34 MILLION/UL (ref 3.88–5.62)
RBC #/AREA URNS AUTO: ABNORMAL /HPF
SODIUM SERPL-SCNC: 140 MMOL/L (ref 135–147)
SP GR UR STRIP.AUTO: 1.01 (ref 1–1.03)
UROBILINOGEN UR STRIP-ACNC: <2 MG/DL
WBC # BLD AUTO: 6.13 THOUSAND/UL (ref 4.31–10.16)
WBC #/AREA URNS AUTO: ABNORMAL /HPF

## 2023-09-23 PROCEDURE — 36415 COLL VENOUS BLD VENIPUNCTURE: CPT | Performed by: EMERGENCY MEDICINE

## 2023-09-23 PROCEDURE — 85025 COMPLETE CBC W/AUTO DIFF WBC: CPT | Performed by: EMERGENCY MEDICINE

## 2023-09-23 PROCEDURE — 76775 US EXAM ABDO BACK WALL LIM: CPT

## 2023-09-23 PROCEDURE — 99284 EMERGENCY DEPT VISIT MOD MDM: CPT

## 2023-09-23 PROCEDURE — 80048 BASIC METABOLIC PNL TOTAL CA: CPT | Performed by: EMERGENCY MEDICINE

## 2023-09-23 PROCEDURE — 96361 HYDRATE IV INFUSION ADD-ON: CPT

## 2023-09-23 PROCEDURE — 81001 URINALYSIS AUTO W/SCOPE: CPT | Performed by: EMERGENCY MEDICINE

## 2023-09-23 PROCEDURE — 96374 THER/PROPH/DIAG INJ IV PUSH: CPT

## 2023-09-23 PROCEDURE — 99284 EMERGENCY DEPT VISIT MOD MDM: CPT | Performed by: EMERGENCY MEDICINE

## 2023-09-23 RX ORDER — TAMSULOSIN HYDROCHLORIDE 0.4 MG/1
0.4 CAPSULE ORAL
Qty: 7 CAPSULE | Refills: 0 | Status: SHIPPED | OUTPATIENT
Start: 2023-09-23 | End: 2023-09-23 | Stop reason: SDUPTHER

## 2023-09-23 RX ORDER — OXYCODONE HYDROCHLORIDE 5 MG/1
5 TABLET ORAL EVERY 6 HOURS PRN
Qty: 7 TABLET | Refills: 0 | Status: SHIPPED | OUTPATIENT
Start: 2023-09-23 | End: 2023-09-28

## 2023-09-23 RX ORDER — KETOROLAC TROMETHAMINE 30 MG/ML
15 INJECTION, SOLUTION INTRAMUSCULAR; INTRAVENOUS ONCE
Status: COMPLETED | OUTPATIENT
Start: 2023-09-23 | End: 2023-09-23

## 2023-09-23 RX ORDER — ACETAMINOPHEN 325 MG/1
650 TABLET ORAL ONCE
Status: DISCONTINUED | OUTPATIENT
Start: 2023-09-23 | End: 2023-09-23 | Stop reason: HOSPADM

## 2023-09-23 RX ORDER — TAMSULOSIN HYDROCHLORIDE 0.4 MG/1
0.4 CAPSULE ORAL
Qty: 7 CAPSULE | Refills: 0 | Status: SHIPPED | OUTPATIENT
Start: 2023-09-23 | End: 2023-09-30

## 2023-09-23 RX ADMIN — KETOROLAC TROMETHAMINE 15 MG: 30 INJECTION, SOLUTION INTRAMUSCULAR; INTRAVENOUS at 15:59

## 2023-09-23 RX ADMIN — SODIUM CHLORIDE 1000 ML: 0.9 INJECTION, SOLUTION INTRAVENOUS at 15:57

## 2023-09-23 NOTE — ED PROVIDER NOTES
History  Chief Complaint   Patient presents with   • Flank Pain     Patient presents again after being told yesterday he had a kidney stone. Patient states it was 3mm. He is unable to tolerate the pain at home. HPI     60-year-old male with history of clotting disorder on Eliquis presenting for evaluation of worsening left-sided flank pain in the setting of known 3 mm obstructing kidney stone at the left UVJ diagnosed on CT scan 2 days ago. Patient was discharged on Flomax as well as oxycodone. States that he had been doing well at home with intermittent pain until today when the pain became much more severe to the point that he felt like he could not deal with it at home so decided to come in for reevaluation. By the time of presentation back to the emergency department he states that the pain has since subsided down to a more tolerable level. He denies vomiting. He has been able to urinate but reports feeling as if he is having incomplete bladder emptying. No dysuria or hematuria. Denies fevers. Prior to Admission Medications   Prescriptions Last Dose Informant Patient Reported? Taking? Eliquis 2.5 MG   No No   Sig: Take 1 tablet by mouth twice daily   ondansetron (ZOFRAN) 4 mg tablet   No No   Sig: Take 1 tablet (4 mg total) by mouth every 8 (eight) hours as needed for nausea or vomiting for up to 2 days   oxyCODONE (ROXICODONE) 5 immediate release tablet   No No   Sig: Take 1 tablet (5 mg total) by mouth every 6 (six) hours as needed for moderate pain for up to 2 days Max Daily Amount: 20 mg   tamsulosin (FLOMAX) 0.4 mg   No No   Sig: Take 1 capsule (0.4 mg total) by mouth daily with dinner for 2 days      Facility-Administered Medications: None       Past Medical History:   Diagnosis Date   • H/O blood clots     Had one in 2016, then another in 2021.        Past Surgical History:   Procedure Laterality Date   • TONSILLECTOMY         Family History   Problem Relation Age of Onset   • Hypertension Mother    • Diabetes type II Mother    • No Known Problems Father         Dad not in the picture   • Deep vein thrombosis Sister    • No Known Problems Sister    • No Known Problems Sister    • No Known Problems Brother    • No Known Problems Daughter      I have reviewed and agree with the history as documented. E-Cigarette/Vaping   • E-Cigarette Use Never User      E-Cigarette/Vaping Substances     Social History     Tobacco Use   • Smoking status: Never   • Smokeless tobacco: Never   Vaping Use   • Vaping Use: Never used   Substance Use Topics   • Alcohol use: Never   • Drug use: Never       Review of Systems   Constitutional: Negative for chills and fever. HENT: Negative for congestion. Eyes: Negative for visual disturbance. Respiratory: Negative for cough and shortness of breath. Cardiovascular: Negative for chest pain and leg swelling. Gastrointestinal: Negative for abdominal pain, diarrhea, nausea and vomiting. Genitourinary: Positive for difficulty urinating (able to urinate, but with feeling of incomplete bladder emptying) and flank pain (L flank pain). Negative for dysuria, frequency, hematuria, penile discharge, penile pain and testicular pain. Musculoskeletal: Negative for arthralgias, back pain, neck pain and neck stiffness. Skin: Negative for rash. Neurological: Negative for weakness, numbness and headaches. Psychiatric/Behavioral: Negative for agitation, behavioral problems and confusion. All other systems reviewed and are negative. Physical Exam  Physical Exam  Constitutional:       General: He is not in acute distress. Appearance: He is well-developed. He is not diaphoretic. HENT:      Head: Normocephalic and atraumatic. Right Ear: External ear normal.      Left Ear: External ear normal.      Nose: Nose normal.   Eyes:      Conjunctiva/sclera: Conjunctivae normal.   Cardiovascular:      Rate and Rhythm: Normal rate and regular rhythm.       Heart sounds: Normal heart sounds. No murmur heard. No friction rub. No gallop. Pulmonary:      Effort: Pulmonary effort is normal. No respiratory distress. Breath sounds: Normal breath sounds. No wheezing or rales. Abdominal:      General: Bowel sounds are normal. There is no distension. Palpations: Abdomen is soft. Tenderness: There is no abdominal tenderness (abdomen benign). There is no right CVA tenderness, left CVA tenderness or guarding. Musculoskeletal:         General: No deformity. Normal range of motion. Cervical back: Normal range of motion and neck supple. Skin:     General: Skin is warm and dry. Neurological:      Mental Status: He is alert and oriented to person, place, and time. Motor: No abnormal muscle tone.    Psychiatric:         Mood and Affect: Mood normal.         Vital Signs  ED Triage Vitals [09/23/23 1508]   Temperature Pulse Respirations Blood Pressure SpO2   98.4 °F (36.9 °C) 95 18 139/67 96 %      Temp Source Heart Rate Source Patient Position - Orthostatic VS BP Location FiO2 (%)   Oral Monitor Sitting Right arm --      Pain Score       --           Vitals:    09/23/23 1508   BP: 139/67   Pulse: 95   Patient Position - Orthostatic VS: Sitting         Visual Acuity      ED Medications  Medications   acetaminophen (TYLENOL) tablet 650 mg (650 mg Oral Not Given 9/23/23 1735)   sodium chloride 0.9 % bolus 1,000 mL (0 mL Intravenous Stopped 9/23/23 1725)   ketorolac (TORADOL) injection 15 mg (15 mg Intravenous Given 9/23/23 1559)       Diagnostic Studies  Results Reviewed     Procedure Component Value Units Date/Time    Basic metabolic panel [[id number]] Collected: 09/23/23 1555    Lab Status: Final result Specimen: Blood from Arm, Right Updated: 09/23/23 1624     Sodium 140 mmol/L      Potassium 3.8 mmol/L      Chloride 106 mmol/L      CO2 27 mmol/L      ANION GAP 7 mmol/L      BUN 11 mg/dL      Creatinine 1.02 mg/dL      Glucose 99 mg/dL      Calcium 9.6 mg/dL eGFR 97 ml/min/1.73sq m     Narrative:      [facility] guidelines for Chronic Kidney Disease (CKD):   •  Stage 1 with normal or high GFR (GFR > 90 mL/min/1.73 square meters)  •  Stage 2 Mild CKD (GFR = 60-89 mL/min/1.73 square meters)  •  Stage 3A Moderate CKD (GFR = 45-59 mL/min/1.73 square meters)  •  Stage 3B Moderate CKD (GFR = 30-44 mL/min/1.73 square meters)  •  Stage 4 Severe CKD (GFR = 15-29 mL/min/1.73 square meters)  •  Stage 5 End Stage CKD (GFR <15 mL/min/1.73 square meters)  Note: GFR calculation is accurate only with a steady state creatinine    Urine Microscopic [[id number]]  (Abnormal) Collected: 09/23/23 1555    Lab Status: Final result Specimen: Urine, Clean Catch Updated: 09/23/23 1609     RBC, UA None Seen /hpf      WBC, UA 1-2 /hpf      Epithelial Cells Occasional /hpf      Bacteria, UA None Seen /hpf      MUCUS THREADS Occasional    CBC and differential [[id number]] Collected: 09/23/23 1555    Lab Status: Final result Specimen: Blood from Arm, Right Updated: 09/23/23 1608     WBC 6.13 Thousand/uL      RBC 5.34 Million/uL      Hemoglobin 16.0 g/dL      Hematocrit 46.8 %      MCV 88 fL      MCH 30.0 pg      MCHC 34.2 g/dL      RDW 12.6 %      MPV 9.8 fL      Platelets 665 Thousands/uL      nRBC 0 /100 WBCs      Neutrophils Relative 67 %      Immat GRANS % 1 %      Lymphocytes Relative 21 %      Monocytes Relative 8 %      Eosinophils Relative 2 %      Basophils Relative 1 %      Neutrophils Absolute 4.20 Thousands/µL      Immature Grans Absolute 0.03 Thousand/uL      Lymphocytes Absolute 1.27 Thousands/µL      Monocytes Absolute 0.49 Thousand/µL      Eosinophils Absolute 0.10 Thousand/µL      Basophils Absolute 0.04 Thousands/µL     UA w Reflex to Microscopic w Reflex to Culture [[id number]]  (Abnormal) Collected: 09/23/23 1555    Lab Status: Final result Specimen: Urine, Clean Catch Updated: 09/23/23 1608     Color, UA Light Yellow     Clarity, UA Clear     Specific [place], UA 1.014     pH, UA 5.5     Leukocytes, UA Negative     Nitrite, UA Negative     Protein, UA Negative mg/dl      Glucose, UA Negative mg/dl      Ketones, UA Negative mg/dl      Urobilinogen, UA <2.0 mg/dl      Bilirubin, UA Negative     Occult Blood, UA Small                 US kidney and bladder   Final Result by Shirley Leal MD (09/23 1703)      1.  3 mm echogenic focus at the level of the left UVJ, likely previously seen small calculus, appears stable in position from prior CT. No hydronephrosis. Workstation performed: [id number]                    Procedures  Procedures         ED Course  ED Course as of 09/23/23 1914   Sat Sep 23, 2023   1623 UA with small blood, no bacteria, doubt UTI. CBC with no leukocytosis. Abdomen benign to deep palpation. No CVA tenderness to suggest pyelonephritis. 1630 Will obtain renal US to evaluate for pyelonephrosis. Patient is able to urinate. Toradol given for pain control. He prefers discharge home rather than admission if possible. [address] of the kidneys and bladder with redemonstration of stone at the L UVJ, no hydronephrosis. Patient continues to report improvement in his pain. He was offered admission for pain control but declines, preferring to f/u with urology as an outpatient. Ambulatory referral placed. Patient was prescribed 2 days of tamsulosin at his last visit so is currently out of it. He was prescribed 8 tablets of oxycodone and has so far taken 2. Will prescribe additional week course of tamsulosin and several more tablets of oxycodone for use as needed for pain relief. Recommend taking Tylenol every 6 hours scheduled and taking a dose of oxycodone as soon as he feels his pain escalating. Strict return precautions discussed. Medical Decision Making  Please see ED course above for details of the Medical Decision Making.        Flank pain: acute illness or injury  Kidney stone: acute illness or injury  Amount and/or Complexity of Data Reviewed  Labs: ordered. Radiology: ordered. Risk  OTC drugs. Prescription drug management. Decision regarding hospitalization. Disposition  Final diagnoses:   Flank pain   Kidney stone     Time reflects when diagnosis was documented in both MDM as applicable and the Disposition within this note     Time User Action Codes Description Comment    9/23/2023  5:16 PM Greg Adorno Add [R10.9] Flank pain     9/23/2023  5:17 PM Sebastianjw Adorno Add [N20.0] Kidney stone       ED Disposition     ED Disposition   Discharge    Condition   Stable    Date/Time   Sat Sep 23, 2023  5:16 PM    Comment   Raf Rubalcava discharge to home/self care. Follow-up Information     Follow up With Specialties Details Why Contact Info Additional [address] [facility]rology [facility] Urology Schedule an appointment as soon as possible for a visit in 1 week For re-evaluation, to ensure that you have passed your kidney stone. [address] [address]  [address] [place] [address] For Urology [facility], [address] [place] [address], [address] [facility] Emergency Department Emergency Medicine  As we discussed, return to the Emergency Department immediately for uncontrolled pain, vomiting with inability to tolerate oral intake, difficulty urinating, fever, or for new or concerning symptoms.  [address] [facility], [place], [place], [address]          Discharge Medication List as of 9/23/2023  5:25 PM      START taking these medications    Details   !! oxyCODONE (Roxicodone) 5 immediate release tablet Take 1 tablet (5 mg total) by mouth every 6 (six) hours as needed for severe pain or moderate pain for up to 5 days Max Daily Amount: 20 mg, Starting Sat 9/23/2023, Until Thu 9/28/2023 at 2359, Normal       !! - Potential duplicate medications found. Please discuss with provider. CONTINUE these medications which have CHANGED    Details   !! tamsulosin (FLOMAX) 0.4 mg Take 1 capsule (0.4 mg total) by mouth daily with dinner for 7 days, Starting Sat 9/23/2023, Until Sat 9/30/2023, Normal       !! - Potential duplicate medications found. Please discuss with provider. CONTINUE these medications which have NOT CHANGED    Details   Eliquis 2.5 MG Take 1 tablet by mouth twice daily, Normal      ondansetron (ZOFRAN) 4 mg tablet Take 1 tablet (4 mg total) by mouth every 8 (eight) hours as needed for nausea or vomiting for up to 2 days, Starting Thu 9/21/2023, Until Sat 9/23/2023 at 2359, Normal      !! oxyCODONE (ROXICODONE) 5 immediate release tablet Take 1 tablet (5 mg total) by mouth every 6 (six) hours as needed for moderate pain for up to 2 days Max Daily Amount: 20 mg, Starting Thu 9/21/2023, Until Sat 9/23/2023 at 2359, Normal      !! tamsulosin (FLOMAX) 0.4 mg Take 1 capsule (0.4 mg total) by mouth daily with dinner for 2 days, Starting u 9/21/2023, Until Sat 9/23/2023, Normal       !! - Potential duplicate medications found. Please discuss with provider.               PDMP Review       Value Time User    PDMP Reviewed  Yes 9/21/2023 11:45 PM Odell Brannon PA-C          ED Provider  Electronically Signed by           Fco Mccollum MD  09/23/23 4172

## 2023-09-23 NOTE — DISCHARGE INSTRUCTIONS
You can take 500 mg of tylenol every 6 hours for the next few days for your pain. I would recommend taking a dose of oxycodone as soon as you feel your pain getting worse to avoid developing severe pain when your stone moves.

## 2023-09-23 NOTE — Clinical Note
Lane Donovan was seen and treated in our emergency department on 9/23/2023. No restrictions            Diagnosis:     Abdoul  may return to work on return date. He may return on this date: 09/25/2023         If you have any questions or concerns, please don't hesitate to call.       Siobhan Suh MD    ______________________________           _______________          _______________  Hospital Representative                              Date                                Time

## 2023-09-23 NOTE — Clinical Note
Gavi Coyle was seen and treated in our emergency department on 9/23/2023. No restrictions            Diagnosis:     Abdoul  may return to work on return date. He may return on this date: 09/25/2023         If you have any questions or concerns, please don't hesitate to call.       Santos Simons MD    ______________________________           _______________          _______________  Hospital Representative                              Date                                Time

## 2023-09-25 ENCOUNTER — HOSPITAL ENCOUNTER (EMERGENCY)
Facility: HOSPITAL | Age: 33
Discharge: HOME | End: 2023-09-25
Attending: EMERGENCY MEDICINE

## 2023-09-25 VITALS
DIASTOLIC BLOOD PRESSURE: 81 MMHG | OXYGEN SATURATION: 100 % | HEART RATE: 113 BPM | SYSTOLIC BLOOD PRESSURE: 170 MMHG | TEMPERATURE: 98.2 F | RESPIRATION RATE: 20 BRPM | WEIGHT: 279.98 LBS | BODY MASS INDEX: 36.94 KG/M2

## 2023-09-25 DIAGNOSIS — R10.9 LEFT FLANK PAIN: Primary | ICD-10-CM

## 2023-09-25 DIAGNOSIS — N20.0 KIDNEY STONE ON LEFT SIDE: ICD-10-CM

## 2023-09-25 PROCEDURE — 99284 EMERGENCY DEPT VISIT MOD MDM: CPT | Performed by: EMERGENCY MEDICINE

## 2023-09-25 PROCEDURE — 96372 THER/PROPH/DIAG INJ SC/IM: CPT

## 2023-09-25 PROCEDURE — 99282 EMERGENCY DEPT VISIT SF MDM: CPT

## 2023-09-25 RX ORDER — KETOROLAC TROMETHAMINE 30 MG/ML
15 INJECTION, SOLUTION INTRAMUSCULAR; INTRAVENOUS ONCE
Status: COMPLETED | OUTPATIENT
Start: 2023-09-25 | End: 2023-09-25

## 2023-09-25 RX ADMIN — KETOROLAC TROMETHAMINE 15 MG: 30 INJECTION INTRAMUSCULAR; INTRAVENOUS at 21:34

## 2023-09-25 NOTE — Clinical Note
Emy Martin was seen and treated in our emergency department on 9/25/2023. Diagnosis:     Abdoul  . He may return on this date: 09/27/2023         If you have any questions or concerns, please don't hesitate to call.       Dez Garcia, DO    ______________________________           _______________          _______________  Hospital Representative                              Date                                Time

## 2023-09-26 NOTE — ED PROVIDER NOTES
History  Chief Complaint   Patient presents with   • Back Pain     Patient here for possible refill of pain medication that was prescribed by us. Pt states he dropped all of them on the floor but still having a lot of pain. Recently diagnosed with a  kidney stone. Patient is a 72-year-old male with a history of hypercoagulability on Eliquis who presents with left flank pain. Patient was initially seen on 9/21 and was diagnosed with a left-sided ureteral stone. He was discharged with Flomax and oxycodone for pain. He is unable to take NSAIDs due to his Eliquis. He returned to the ED 2 days later for similar symptoms. He was once again discharged with oxycodone and Flomax. He presents today for a refill of his prescription. He states that he tried to open the pill bottle and all of the pills fell on the floor. He states that he did not pick them up because the floor was dirty and states that he needs a refill. He states that Tylenol does not help with his pain and he has been taking Flomax as directed. He has not called urology, because he is worried about the cost.  He denies fever, chills, nausea, vomiting. He states that the left flank pain radiates into his left lower abdomen. This is similar pain as he was experiencing during his previous ED visits. History provided by:  Patient  Medication Refill      Prior to Admission Medications   Prescriptions Last Dose Informant Patient Reported? Taking?    Eliquis 2.5 MG   No Yes   Sig: Take 1 tablet by mouth twice daily   ondansetron (ZOFRAN) 4 mg tablet   No No   Sig: Take 1 tablet (4 mg total) by mouth every 8 (eight) hours as needed for nausea or vomiting for up to 2 days   oxyCODONE (Roxicodone) 5 immediate release tablet   No Yes   Sig: Take 1 tablet (5 mg total) by mouth every 6 (six) hours as needed for severe pain or moderate pain for up to 5 days Max Daily Amount: 20 mg   tamsulosin (FLOMAX) 0.4 mg   No No   Sig: Take 1 capsule (0.4 mg total) by mouth daily with dinner for 2 days   tamsulosin (FLOMAX) 0.4 mg   No Yes   Sig: Take 1 capsule (0.4 mg total) by mouth daily with dinner for 7 days      Facility-Administered Medications: None       Past Medical History:   Diagnosis Date   • H/O blood clots     Had one in 2016, then another in 2021. Past Surgical History:   Procedure Laterality Date   • TONSILLECTOMY         Family History   Problem Relation Age of Onset   • Hypertension Mother    • Diabetes type II Mother    • No Known Problems Father         Dad not in the picture   • Deep vein thrombosis Sister    • No Known Problems Sister    • No Known Problems Sister    • No Known Problems Brother    • No Known Problems Daughter      I have reviewed and agree with the history as documented. E-Cigarette/Vaping   • E-Cigarette Use Never User      E-Cigarette/Vaping Substances     Social History     Tobacco Use   • Smoking status: Never   • Smokeless tobacco: Never   Vaping Use   • Vaping Use: Never used   Substance Use Topics   • Alcohol use: Never   • Drug use: Never       Review of Systems   Constitutional: Negative for chills, diaphoresis and fever. HENT: Negative for nosebleeds, sore throat and trouble swallowing. Eyes: Negative for photophobia, pain and visual disturbance. Respiratory: Negative for cough, chest tightness and shortness of breath. Cardiovascular: Negative for chest pain, palpitations and leg swelling. Gastrointestinal: Positive for abdominal pain. Negative for constipation, diarrhea, nausea and vomiting. Endocrine: Negative for polydipsia and polyuria. Genitourinary: Negative for difficulty urinating, dysuria and hematuria. Musculoskeletal: Negative for back pain, neck pain and neck stiffness. Skin: Negative for pallor and rash. Neurological: Negative for dizziness, syncope, light-headedness and headaches. All other systems reviewed and are negative.       Physical Exam  Physical Exam  Vitals and nursing note reviewed. Constitutional:       General: He is not in acute distress. Appearance: He is well-developed. HENT:      Head: Normocephalic and atraumatic. Eyes:      Pupils: Pupils are equal, round, and reactive to light. Cardiovascular:      Rate and Rhythm: Regular rhythm. Tachycardia present. Pulses: Normal pulses. Heart sounds: Normal heart sounds. Pulmonary:      Effort: Pulmonary effort is normal. No respiratory distress. Breath sounds: Normal breath sounds. Abdominal:      General: There is no distension. Palpations: Abdomen is soft. Abdomen is not rigid. Tenderness: There is no abdominal tenderness. There is left CVA tenderness. There is no guarding or rebound. Musculoskeletal:         General: No tenderness. Normal range of motion. Cervical back: Normal range of motion and neck supple. Lymphadenopathy:      Cervical: No cervical adenopathy. Skin:     General: Skin is warm and dry. Capillary Refill: Capillary refill takes less than 2 seconds. Neurological:      Mental Status: He is alert and oriented to person, place, and time. Cranial Nerves: No cranial nerve deficit. Sensory: No sensory deficit.          Vital Signs  ED Triage Vitals   Temperature Pulse Respirations Blood Pressure SpO2   09/25/23 2011 09/25/23 2011 09/25/23 2011 09/25/23 2011 09/25/23 2011   98.2 °F (36.8 °C) (!) 113 20 170/81 100 %      Temp src Heart Rate Source Patient Position - Orthostatic VS BP Location FiO2 (%)   -- 09/25/23 2011 09/25/23 2011 09/25/23 2011 --    Monitor Sitting Right arm       Pain Score       09/25/23 2134       8           Vitals:    09/25/23 2011   BP: 170/81   Pulse: (!) 113   Patient Position - Orthostatic VS: Sitting         Visual Acuity      ED Medications  Medications   ketorolac (TORADOL) injection 15 mg (15 mg Intramuscular Given 9/25/23 2134)       Diagnostic Studies  Results Reviewed     None                 No orders to display Procedures  Procedures         ED Course                                             Medical Decision Making  Patient presents with persistent left flank pain. He was previously evaluated and diagnosed with left UVJ stone. Patient was prescribed oxycodone and states that he spilled his most recent prescription on the ground. He did not want to pick them up because the ground was dirty. He presents today for a refill of his prescription. I do not feel comfortable refilling his prescription. I told him that he needed to be evaluated by urology if his symptoms are persistent and he is not passing the stone. I recommended bedside ultrasound to rule out hydronephrosis. However patient declines stating that he is worried about the cost.  I told him that it would not be recorded in the medical record, but would be helpful to my decision making. He continues to decline work-up in the ED including bedside ultrasound. Patient is unable to take NSAIDs due to history of anticoagulation. I do not feel comfortable giving him additional opioids. Therefore patient must continue Tylenol and Flomax for his symptoms. We discussed the importance of urology follow-up. I also recommended that he return to ED if his symptoms persist or he develops fever, chills, vomiting, other concerns. Portions of the above record have been created with voice recognition software.  Occasional wrong word or "sound alike" substitutions may have occurred due to the inherent limitations of voice recognition software.  Read the chart carefully and recognize, using context, where substitutions may have occurred. Kidney stone on left side:     Details: Previous imaging indicates left UVJ stone. I recommended bedside ultrasound today to rule out hydronephrosis, however he declines. I do not feel comfortable giving additional opioids and will treat with Tylenol and Flomax.   Patient is in need of urology follow-up given persistent symptoms. Left flank pain:     Details: May be secondary to ureteral stone. No rash. No acute trauma. Do not suspect acute surgical process including but not limited to acute cholecystitis, acute appendicitis, SBO, mesenteric ischemia, AAA, vascular dissection, vascular occlusion, perforated viscus, testicular torsion. Do not suspect intrathoracic cause of flank pain. Symptoms improved and patient is tolerating po. Patient advised to return to ED if symptoms worsen or persist. Patient also advised to follow up with PCP. Amount and/or Complexity of Data Reviewed  External Data Reviewed: notes. Risk  Prescription drug management. Disposition  Final diagnoses:   Left flank pain   Kidney stone on left side     Time reflects when diagnosis was documented in both MDM as applicable and the Disposition within this note     Time User Action Codes Description Comment    9/25/2023  9:18 PM Jerrica Lopez JARED Add [R10.9] Left flank pain     9/25/2023  9:19 PM Baldev Morrow Add [N20.0] Kidney stone on left side       ED Disposition     ED Disposition   Discharge    Condition   Stable    Date/Time   Mon Sep 25, 2023  9:18 PM    Comment   Kyra Richardson discharge to home/self care.                Follow-up Information     Follow up With Specialties Details Why Contact Info Additional [id number] [facility] Schedule an appointment as soon as possible for a visit   [address]  [facility] [phone] [address] [facility], [address] [place] [address], [address]          Discharge Medication List as of 9/25/2023  9:29 PM      CONTINUE these medications which have NOT CHANGED    Details   Eliquis 2.5 MG Take 1 tablet by mouth twice daily, Normal      oxyCODONE (Roxicodone) 5 immediate release tablet Take 1 tablet (5 mg total) by mouth every 6 (six) hours as needed for severe pain or moderate pain for up to 5 days Max Daily Amount: 20 mg, Starting Sat 9/23/2023, Until Thu 9/28/2023 at 2359, Normal      tamsulosin (FLOMAX) 0.4 mg Take 1 capsule (0.4 mg total) by mouth daily with dinner for 7 days, Starting Sat 9/23/2023, Until Sat 9/30/2023, Normal      ondansetron (ZOFRAN) 4 mg tablet Take 1 tablet (4 mg total) by mouth every 8 (eight) hours as needed for nausea or vomiting for up to 2 days, Starting Thu 9/21/2023, Until Sat 9/23/2023 at 2359, Normal             No discharge procedures on file.     PDMP Review       Value Time User    PDMP Reviewed  Yes 9/21/2023 11:45 PM Sonal Waller PA-C          ED Provider  Electronically Signed by           Marialuisa Rodriguez DO  09/25/23 7119

## 2024-10-04 ENCOUNTER — HOSPITAL ENCOUNTER (EMERGENCY)
Facility: HOSPITAL | Age: 34
Discharge: HOME | End: 2024-10-04
Attending: EMERGENCY MEDICINE
Payer: COMMERCIAL

## 2024-10-04 ENCOUNTER — APPOINTMENT (EMERGENCY)
Dept: RADIOLOGY | Facility: HOSPITAL | Age: 34
End: 2024-10-04
Payer: COMMERCIAL

## 2024-10-04 VITALS
OXYGEN SATURATION: 98 % | HEART RATE: 96 BPM | WEIGHT: 262.13 LBS | TEMPERATURE: 98.7 F | RESPIRATION RATE: 18 BRPM | BODY MASS INDEX: 34.58 KG/M2 | DIASTOLIC BLOOD PRESSURE: 88 MMHG | SYSTOLIC BLOOD PRESSURE: 155 MMHG

## 2024-10-04 DIAGNOSIS — D68.59 ANTITHROMBIN III DEFICIENCY (HCC): ICD-10-CM

## 2024-10-04 DIAGNOSIS — Z86.718 HISTORY OF DVT (DEEP VEIN THROMBOSIS): ICD-10-CM

## 2024-10-04 DIAGNOSIS — S29.011A MUSCLE STRAIN OF CHEST WALL, INITIAL ENCOUNTER: Primary | ICD-10-CM

## 2024-10-04 LAB
ALBUMIN SERPL BCG-MCNC: 4 G/DL (ref 3.5–5)
ALP SERPL-CCNC: 88 U/L (ref 34–104)
ALT SERPL W P-5'-P-CCNC: 17 U/L (ref 7–52)
ANION GAP SERPL CALCULATED.3IONS-SCNC: 5 MMOL/L (ref 4–13)
AST SERPL W P-5'-P-CCNC: 18 U/L (ref 13–39)
BASOPHILS # BLD AUTO: 0.05 THOUSANDS/ΜL (ref 0–0.1)
BASOPHILS NFR BLD AUTO: 1 % (ref 0–1)
BILIRUB SERPL-MCNC: 0.53 MG/DL (ref 0.2–1)
BUN SERPL-MCNC: 10 MG/DL (ref 5–25)
CALCIUM SERPL-MCNC: 9.1 MG/DL (ref 8.4–10.2)
CARDIAC TROPONIN I PNL SERPL HS: 3 NG/L
CHLORIDE SERPL-SCNC: 107 MMOL/L (ref 96–108)
CO2 SERPL-SCNC: 28 MMOL/L (ref 21–32)
CREAT SERPL-MCNC: 0.83 MG/DL (ref 0.6–1.3)
EOSINOPHIL # BLD AUTO: 0.15 THOUSAND/ΜL (ref 0–0.61)
EOSINOPHIL NFR BLD AUTO: 3 % (ref 0–6)
ERYTHROCYTE [DISTWIDTH] IN BLOOD BY AUTOMATED COUNT: 12.5 % (ref 11.6–15.1)
GFR SERPL CREATININE-BSD FRML MDRD: 116 ML/MIN/1.73SQ M
GLUCOSE SERPL-MCNC: 83 MG/DL (ref 65–140)
HCT VFR BLD AUTO: 45.1 % (ref 36.5–49.3)
HGB BLD-MCNC: 15.4 G/DL (ref 12–17)
IMM GRANULOCYTES # BLD AUTO: 0.02 THOUSAND/UL (ref 0–0.2)
IMM GRANULOCYTES NFR BLD AUTO: 0 % (ref 0–2)
LYMPHOCYTES # BLD AUTO: 1.4 THOUSANDS/ΜL (ref 0.6–4.47)
LYMPHOCYTES NFR BLD AUTO: 24 % (ref 14–44)
MCH RBC QN AUTO: 30 PG (ref 26.8–34.3)
MCHC RBC AUTO-ENTMCNC: 34.1 G/DL (ref 31.4–37.4)
MCV RBC AUTO: 88 FL (ref 82–98)
MONOCYTES # BLD AUTO: 0.53 THOUSAND/ΜL (ref 0.17–1.22)
MONOCYTES NFR BLD AUTO: 9 % (ref 4–12)
NEUTROPHILS # BLD AUTO: 3.74 THOUSANDS/ΜL (ref 1.85–7.62)
NEUTS SEG NFR BLD AUTO: 63 % (ref 43–75)
NRBC BLD AUTO-RTO: 0 /100 WBCS
PLATELET # BLD AUTO: 277 THOUSANDS/UL (ref 149–390)
PMV BLD AUTO: 9.7 FL (ref 8.9–12.7)
POTASSIUM SERPL-SCNC: 3.6 MMOL/L (ref 3.5–5.3)
PROT SERPL-MCNC: 8.1 G/DL (ref 6.4–8.4)
RBC # BLD AUTO: 5.13 MILLION/UL (ref 3.88–5.62)
SODIUM SERPL-SCNC: 140 MMOL/L (ref 135–147)
WBC # BLD AUTO: 5.89 THOUSAND/UL (ref 4.31–10.16)

## 2024-10-04 PROCEDURE — 93005 ELECTROCARDIOGRAM TRACING: CPT

## 2024-10-04 PROCEDURE — 85025 COMPLETE CBC W/AUTO DIFF WBC: CPT | Performed by: EMERGENCY MEDICINE

## 2024-10-04 PROCEDURE — 99285 EMERGENCY DEPT VISIT HI MDM: CPT | Performed by: EMERGENCY MEDICINE

## 2024-10-04 PROCEDURE — 80053 COMPREHEN METABOLIC PANEL: CPT | Performed by: EMERGENCY MEDICINE

## 2024-10-04 PROCEDURE — 36415 COLL VENOUS BLD VENIPUNCTURE: CPT

## 2024-10-04 PROCEDURE — 99285 EMERGENCY DEPT VISIT HI MDM: CPT

## 2024-10-04 PROCEDURE — 71045 X-RAY EXAM CHEST 1 VIEW: CPT

## 2024-10-04 PROCEDURE — 84484 ASSAY OF TROPONIN QUANT: CPT | Performed by: EMERGENCY MEDICINE

## 2024-10-04 RX ORDER — TRAMADOL HYDROCHLORIDE 50 MG/1
50 TABLET ORAL EVERY 6 HOURS PRN
Qty: 15 TABLET | Refills: 0 | Status: SHIPPED | OUTPATIENT
Start: 2024-10-04

## 2024-10-04 RX ORDER — ACETAMINOPHEN 325 MG/1
975 TABLET ORAL ONCE
Status: COMPLETED | OUTPATIENT
Start: 2024-10-04 | End: 2024-10-04

## 2024-10-04 RX ADMIN — ACETAMINOPHEN 325MG 975 MG: 325 TABLET ORAL at 10:13

## 2024-10-04 NOTE — ED PROVIDER NOTES
Final diagnoses:   Muscle strain of chest wall, initial encounter   History of DVT (deep vein thrombosis)   Antithrombin III deficiency (HCC)     ED Disposition       ED Disposition   Discharge    Condition   Stable    Date/Time   Fri Oct 4, 2024 10:22 AM    Comment   Abdoul Caballero discharge to home/self care.                   Assessment & Plan       Medical Decision Making  34-year-old male, presenting for intermittent right-sided chest pain ongoing for the last 3 days after lifting heavy at work.    Differential diagnoses included but not limited to: Musculoskeletal strain, Doubt ACS, doubt PE.   CT imaging to rule out PE not pursued given lack of current symptoms of shortness of breath.  Patient to be prescribed Eliquis for known DVT left leg given recent medication lapse.     Orders written for labs, EKG, chest x-ray.     Patient initially treated with Tylenol PO for pain.    See ED course for full details.     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  Advised patient that I will be prescribing him a course of Eliquis but that he will need to start over, given his recent lapse in medication for the past 1 month.  Prescription sent to his pharmacy including loading dose of 10 mg twice daily for 1 week, with discharge maintenance dose of 5 mg twice daily for 23 days.  Patient advised to follow-up with his hematologist/oncologist for continued management of his DVT and Antithrombin III deficiency.    On reevaluation the patient appears well, is in no acute distress, and is comfortable with discharge.   Patient reports improvement of his pain status post Tylenol.  Initial tachycardic during EKG has resolved.     Advised the patient on the emergent signs and symptoms for which he should return to the ED and encouraged continued follow-up with his regular doctor and hematologist oncologist.     Patient  "verbalized understanding of plan of care and was given the opportunity to ask questions.     Patient to be prescribed Ultram to take as needed for pain at home.       Amount and/or Complexity of Data Reviewed  Labs: ordered. Decision-making details documented in ED Course.  Radiology: ordered and independent interpretation performed. Decision-making details documented in ED Course.    Risk  OTC drugs.  Prescription drug management.        ED Course as of 10/04/24 1749   Fri Oct 04, 2024   1021 hs TnI 0hr: 3   1021 CBC and differential  No leukocytosis, or thrombocytopenia. Hgb/Hct stable    1021 XR chest 1 view portable  No acute cardiopulmonary abnormality, on CXR as interpreted by me.      1022 Comprehensive metabolic panel  No evidence of end organ damage or electrolyte abnormality        Medications   acetaminophen (TYLENOL) tablet 975 mg (975 mg Oral Given 10/4/24 1013)       ED Risk Strat Scores   HEART Risk Score      Flowsheet Row Most Recent Value   Heart Score Risk Calculator    History 0 Filed at: 10/04/2024 1029   ECG 0 Filed at: 10/04/2024 1029   Age 0 Filed at: 10/04/2024 1029   Risk Factors 1 Filed at: 10/04/2024 1029   Troponin 0 Filed at: 10/04/2024 1029   HEART Score 1 Filed at: 10/04/2024 1029                                                   History of Present Illness       Chief Complaint   Patient presents with    Chest Pain     Pt c/o R sided \"sharp\" chest pain that radiates straight through to his back. Intermittent since yesterday.       Past Medical History:   Diagnosis Date    H/O blood clots     Had one in 2016, then another in 2021.      Past Surgical History:   Procedure Laterality Date    TONSILLECTOMY        Family History   Problem Relation Age of Onset    Hypertension Mother     Diabetes type II Mother     No Known Problems Father         Dad not in the picture    Deep vein thrombosis Sister     No Known Problems Sister     No Known Problems Sister     No Known Problems Brother     " No Known Problems Daughter       Social History     Tobacco Use    Smoking status: Never    Smokeless tobacco: Never   Vaping Use    Vaping status: Never Used   Substance Use Topics    Alcohol use: Never    Drug use: Never      E-Cigarette/Vaping    E-Cigarette Use Never User       E-Cigarette/Vaping Substances      I have reviewed and agree with the history as documented.     HPI  Patient is a 34-year-old male, previous history of DVT on Eliquis, who presents to the emergency department for evaluation of right-sided chest pain ongoing for the last 3 days.  Patient states that he his pain began after a workday where he was lifting heavier than usual.  Patient states his pain worsens with movement and taking a deep breath.  Patient describes the pain as a dull ache which radiates to his upper back.  Patient denies any recent fevers, chills, nausea, vomiting, shortness of breath, previous cardiac history.  Patient states approximately 1 month ago he ran out of his Eliquis due to loss of insurance.  Patient denies any calf swelling or tenderness.     Review of Systems   Constitutional:  Negative for chills and fever.   Respiratory:  Negative for chest tightness and shortness of breath.    Cardiovascular:  Positive for chest pain. Negative for palpitations.   Gastrointestinal:  Negative for abdominal pain, constipation, diarrhea, nausea and vomiting.   Genitourinary:  Negative for dysuria, frequency and urgency.   All other systems reviewed and are negative.          Objective       ED Triage Vitals [10/04/24 0926]   Temperature Pulse Blood Pressure Respirations SpO2 Patient Position - Orthostatic VS   98.7 °F (37.1 °C) 96 155/88 18 98 % Sitting      Temp src Heart Rate Source BP Location FiO2 (%) Pain Score    -- Monitor Right arm -- 8      Vitals      Date and Time Temp Pulse SpO2 Resp BP Pain Score FACES Pain Rating User   10/04/24 1013 -- -- -- -- -- 8 -- LD   10/04/24 0926 98.7 °F (37.1 °C) 96 98 % 18 155/88 8 --  DB            Physical Exam  Vitals (Systolic BP elevated. Patient not tachycardic.) reviewed.   Constitutional:       Appearance: He is well-developed. He is not ill-appearing.   HENT:      Head: Normocephalic and atraumatic.   Cardiovascular:      Rate and Rhythm: Normal rate and regular rhythm. No extrasystoles are present.     Heart sounds: Normal heart sounds. No murmur heard.     No systolic murmur is present.      No diastolic murmur is present.      No friction rub. No gallop.   Pulmonary:      Effort: Pulmonary effort is normal.      Breath sounds: No decreased breath sounds, wheezing, rhonchi or rales.   Chest:      Chest wall: Tenderness present.      Comments: Reproducible chest wall tenderness on palpation   Abdominal:      Palpations: Abdomen is soft.      Tenderness: There is no abdominal tenderness.   Musculoskeletal:         General: Normal range of motion.      Cervical back: Normal range of motion.   Skin:     General: Skin is warm.      Capillary Refill: Capillary refill takes less than 2 seconds.   Neurological:      General: No focal deficit present.      Mental Status: He is alert and oriented to person, place, and time.   Psychiatric:         Mood and Affect: Mood normal.         Behavior: Behavior normal.         Results Reviewed       Procedure Component Value Units Date/Time    HS Troponin 0hr (reflex protocol) [[id number]]  (Normal) Collected: 10/04/24 0930    Lab Status: Final result Specimen: Blood from Arm, Left Updated: 10/04/24 1000     hs TnI 0hr 3 ng/L     Comprehensive metabolic panel [[id number]] Collected: 10/04/24 0930    Lab Status: Final result Specimen: Blood from Arm, Left Updated: 10/04/24 0953     Sodium 140 mmol/L      Potassium 3.6 mmol/L      Chloride 107 mmol/L      CO2 28 mmol/L      ANION GAP 5 mmol/L      BUN 10 mg/dL      Creatinine 0.83 mg/dL      Glucose 83 mg/dL      Calcium 9.1 mg/dL      AST 18 U/L      ALT 17 U/L      Alkaline Phosphatase 88 U/L      Total  Protein 8.1 g/dL      Albumin 4.0 g/dL      Total Bilirubin 0.53 mg/dL      eGFR 116 ml/min/1.73sq m     Narrative:      National Kidney Disease Foundation guidelines for Chronic Kidney Disease (CKD):     Stage 1 with normal or high GFR (GFR > 90 mL/min/1.73 square meters)    Stage 2 Mild CKD (GFR = 60-89 mL/min/1.73 square meters)    Stage 3A Moderate CKD (GFR = 45-59 mL/min/1.73 square meters)    Stage 3B Moderate CKD (GFR = 30-44 mL/min/1.73 square meters)    Stage 4 Severe CKD (GFR = 15-29 mL/min/1.73 square meters)    Stage 5 End Stage CKD (GFR <15 mL/min/1.73 square meters)  Note: GFR calculation is accurate only with a steady state creatinine    CBC and differential [[id number]] Collected: 10/04/24 0930    Lab Status: Final result Specimen: Blood from Arm, Left Updated: 10/04/24 0936     WBC 5.89 Thousand/uL      RBC 5.13 Million/uL      Hemoglobin 15.4 g/dL      Hematocrit 45.1 %      MCV 88 fL      MCH 30.0 pg      MCHC 34.1 g/dL      RDW 12.5 %      MPV 9.7 fL      Platelets 277 Thousands/uL      nRBC 0 /100 WBCs      Segmented % 63 %      Immature Grans % 0 %      Lymphocytes % 24 %      Monocytes % 9 %      Eosinophils Relative 3 %      Basophils Relative 1 %      Absolute Neutrophils 3.74 Thousands/µL      Absolute Immature Grans 0.02 Thousand/uL      Absolute Lymphocytes 1.40 Thousands/µL      Absolute Monocytes 0.53 Thousand/µL      Eosinophils Absolute 0.15 Thousand/µL      Basophils Absolute 0.05 Thousands/µL             XR chest 1 view portable   ED Interpretation by Neil OLIVO DO (10/04 1021)   No acute cardiopulmonary abnormality, as interpreted by me.       Final Interpretation by Quentin Rodgers MD (10/04 1137)      No acute cardiopulmonary disease.            Resident: AMANDA PERSAUD I, the attending radiologist, have reviewed the images and agree with the final report above.      Workstation performed: [id number]             ECG 12 Lead Documentation Only    Date/Time:  10/4/2024 5:42 PM    Performed by: Neil OLIVO DO  Authorized by: Neil OLIVO DO    Indications / Diagnosis:  Chest pain  ECG reviewed by me, the ED Provider: yes    Patient location:  ED  Previous ECG:     Previous ECG:  Compared to current    Similarity:  No change  Interpretation:     Interpretation: non-specific    Rate:     ECG rate:  108    ECG rate assessment: tachycardic    Rhythm:     Rhythm: sinus tachycardia    Ectopy:     Ectopy: none    QRS:     QRS axis:  Normal  Conduction:     Conduction: normal    ST segments:     ST segments:  Normal  T waves:     T waves: normal        ED Medication and Procedure Management   Prior to Admission Medications   Prescriptions Last Dose Informant Patient Reported? Taking?   Eliquis 2.5 MG   No No   Sig: Take 1 tablet by mouth twice daily   ondansetron (ZOFRAN) 4 mg tablet   No No   Sig: Take 1 tablet (4 mg total) by mouth every 8 (eight) hours as needed for nausea or vomiting for up to 2 days   tamsulosin (FLOMAX) 0.4 mg   No No   Sig: Take 1 capsule (0.4 mg total) by mouth daily with dinner for 7 days      Facility-Administered Medications: None     Discharge Medication List as of 10/4/2024 10:28 AM        START taking these medications    Details   !! apixaban (ELIQUIS) 5 mg Multiple Dosages:Starting Fri 10/4/2024, Until Thu 10/10/2024 at 2359, THEN Starting Fri 10/11/2024, Until Sat 11/2/2024 at 2359Take 2 tablets (10 mg total) by mouth 2 (two) times a day for 7 days, THEN 1 tablet (5 mg total) 2 (two) times a day for 23  days., Normal      traMADol (ULTRAM) 50 mg tablet Take 1 tablet (50 mg total) by mouth every 6 (six) hours as needed for moderate pain for up to 15 doses, Starting Fri 10/4/2024, Normal       !! - Potential duplicate medications found. Please discuss with provider.        CONTINUE these medications which have NOT CHANGED    Details   !! Eliquis 2.5 MG Take 1 tablet by mouth twice daily, Normal      ondansetron (ZOFRAN) 4 mg tablet  Take 1 tablet (4 mg total) by mouth every 8 (eight) hours as needed for nausea or vomiting for up to 2 days, Starting Thu 9/21/2023, Until Sat 9/23/2023 at 2359, Normal      tamsulosin (FLOMAX) 0.4 mg Take 1 capsule (0.4 mg total) by mouth daily with dinner for 7 days, Starting Sat 9/23/2023, Until Sat 9/30/2023, Normal       !! - Potential duplicate medications found. Please discuss with provider.        No discharge procedures on file.  ED SEPSIS DOCUMENTATION   Time reflects when diagnosis was documented in both MDM as applicable and the Disposition within this note       Time User Action Codes Description Comment    10/4/2024 10:23 AM Kunal Bhakta Add [S29.011A] Muscle strain of chest wall, initial encounter     10/4/2024 10:26 AM Neil Steve Add [Z86.718] History of DVT (deep vein thrombosis)     10/4/2024 10:26 AM Neil Steve Add [D68.59] Antithrombin III deficiency (HCC)                  Niel Steve V,   10/04/24 1749       Neil Steve V,   10/04/24 1754

## 2024-10-04 NOTE — ED ATTENDING ATTESTATION
"10/4/2024  I, Kunal Bhakta MD, saw and evaluated the patient. I have discussed the patient with the resident/non-physician practitioner and agree with the resident's/non-physician practitioner's findings, Plan of Care, and MDM as documented in the resident's/non-physician practitioner's note, except where noted. All available labs and Radiology studies were reviewed.  I was present for key portions of any procedure(s) performed by the resident/non-physician practitioner and I was immediately available to provide assistance.       At this point I agree with the current assessment done in the Emergency Department.  I have conducted an independent evaluation of this patient a history and physical is as follows:    S:  Chief Complaint   Patient presents with    Chest Pain     Pt c/o R sided \"sharp\" chest pain that radiates straight through to his back. Intermittent since yesterday.     Abdoul is a 34 y.o. male who presents with the chief complaint of right sided chest pain.  This pain came on after a difficult/heavy workday (works at [employer] and the [employer]).  He states the pain is worse with movement and taking a deep breath.  Onset was 3 days ago.  Pain does radiate around to the right upper back.  No nausea, vomiting or shortness of breath.  No CAD history.  He does have a dvt history, treating with eliquis (however recently ran out of medication).  He does not have calf tenderness or swelling, no shortness of breath, no hypoxia.      O:  ED Triage Vitals [10/04/24 0926]   Temperature Pulse Respirations Blood Pressure SpO2   98.7 °F (37.1 °C) 96 18 155/88 98 %      Temp src Heart Rate Source Patient Position - Orthostatic VS BP Location FiO2 (%)   -- Monitor Sitting Right arm --      Pain Score       8         Physical Exam  Vitals and nursing note reviewed.   Constitutional:       General: He is in acute distress (mild).      Appearance: He is well-developed.   HENT:      Head: Normocephalic " "and atraumatic.   Eyes:      Pupils: Pupils are equal, round, and reactive to light.   Neck:      Vascular: No JVD.   Cardiovascular:      Rate and Rhythm: Normal rate and regular rhythm.      Heart sounds: Normal heart sounds. No murmur heard.     No friction rub. No gallop.   Pulmonary:      Effort: Pulmonary effort is normal. No respiratory distress.      Breath sounds: Normal breath sounds. No wheezing or rales.   Chest:      Chest wall: No tenderness.   Musculoskeletal:         General: No tenderness. Normal range of motion.      Cervical back: Normal range of motion.   Skin:     General: Skin is warm and dry.   Neurological:      General: No focal deficit present.      Mental Status: He is alert and oriented to person, place, and time.   Psychiatric:         Behavior: Behavior normal.         Thought Content: Thought content normal.         Judgment: Judgment normal.       A/P:  Background: 34 y.o. male with chest pain    Differential DX includes but is not limited to: muscular tear (reproducible with palpation and stressing the pectoral muscle), less likely atypical acs/mi, pleurisy, doubt dissection, doubt pneumonia, although patient has history of dvt, he does not have any physical findings consistent with dvt (no leg pain, swelling), is not short of breath or hypoxic (98% on room air, RR 18), has an alternative diagnosis that is very likely.  We will prescribe anticoagulation restart however which would cover any potential dvt/subsegmental PE that may be present    Plan: cardiac workup, xarelto or eliquis prescription        ED Course     Labs Reviewed   HS TROPONIN I 0HR - Normal       Result Value Ref Range Status    hs TnI 0hr 3  \"Refer to ACS Flowchart\"- see link ng/L Final    Comment:                                              Initial (time 0) result  If >=50 ng/L, Myocardial injury suggested ;  Type of myocardial injury and treatment strategy  to be determined.  If 5-49 ng/L, a delta result at 2 " hours and or 4 hours will be needed to further evaluate.  If <4 ng/L, and chest pain has been >3 hours since onset, patient may qualify for discharge based on the HEART score in the ED.  If <5 ng/L and <3hours since onset of chest pain, a delta result at 2 hours will be needed to further evaluate.    HS Troponin 99th Percentile URL of a Health Population=12 ng/L with a 95% Confidence Interval of 8-18 ng/L.    Second Troponin (time 2 hours)  If calculated delta >= 20 ng/L,  Myocardial injury suggested ; Type of myocardial injury and treatment strategy to be determined.  If 5-49 ng/L and the calculated delta is 5-19 ng/L, consult medical service for evaluation.  Continue evaluation for ischemia on ecg and other possible etiology and repeat hs troponin at 4 hours.  If delta is <5 ng/L at 2 hours, consider discharge based on risk stratification via the HEART score (if in ED), or KEENAN risk score in IP/Observation.    HS Troponin 99th Percentile URL of a Health Population=12 ng/L with a 95% Confidence Interval of 8-18 ng/L.   CBC AND DIFFERENTIAL    WBC 5.89  4.31 - 10.16 Thousand/uL Final    RBC 5.13  3.88 - 5.62 Million/uL Final    Hemoglobin 15.4  12.0 - 17.0 g/dL Final    Hematocrit 45.1  36.5 - 49.3 % Final    MCV 88  82 - 98 fL Final    MCH 30.0  26.8 - 34.3 pg Final    MCHC 34.1  31.4 - 37.4 g/dL Final    RDW 12.5  11.6 - 15.1 % Final    MPV 9.7  8.9 - 12.7 fL Final    Platelets 277  149 - 390 Thousands/uL Final    nRBC 0  /100 WBCs Final    Segmented % 63  43 - 75 % Final    Immature Grans % 0  0 - 2 % Final    Lymphocytes % 24  14 - 44 % Final    Monocytes % 9  4 - 12 % Final    Eosinophils Relative 3  0 - 6 % Final    Basophils Relative 1  0 - 1 % Final    Absolute Neutrophils 3.74  1.85 - 7.62 Thousands/µL Final    Absolute Immature Grans 0.02  0.00 - 0.20 Thousand/uL Final    Absolute Lymphocytes 1.40  0.60 - 4.47 Thousands/µL Final    Absolute Monocytes 0.53  0.17 - 1.22 Thousand/µL Final    Eosinophils  Absolute 0.15  0.00 - 0.61 Thousand/µL Final    Basophils Absolute 0.05  0.00 - 0.10 Thousands/µL Final   COMPREHENSIVE METABOLIC PANEL    Sodium 140  135 - 147 mmol/L Final    Potassium 3.6  3.5 - 5.3 mmol/L Final    Chloride 107  96 - 108 mmol/L Final    CO2 28  21 - 32 mmol/L Final    ANION GAP 5  4 - 13 mmol/L Final    BUN 10  5 - 25 mg/dL Final    Creatinine 0.83  0.60 - 1.30 mg/dL Final    Comment: Standardized to IDMS reference method    Glucose 83  65 - 140 mg/dL Final    Comment: If the patient is fasting, the ADA then defines impaired fasting glucose as > 100 mg/dL and diabetes as > or equal to 123 mg/dL.    Calcium 9.1  8.4 - 10.2 mg/dL Final    AST 18  13 - 39 U/L Final    ALT 17  7 - 52 U/L Final    Comment: Specimen collection should occur prior to Sulfasalazine administration due to the potential for falsely depressed results.     Alkaline Phosphatase 88  34 - 104 U/L Final    Total Protein 8.1  6.4 - 8.4 g/dL Final    Albumin 4.0  3.5 - 5.0 g/dL Final    Total Bilirubin 0.53  0.20 - 1.00 mg/dL Final    Comment: Use of this assay is not recommended for patients undergoing treatment with eltrombopag due to the potential for falsely elevated results.  N-acetyl-p-benzoquinone imine (metabolite of Acetaminophen) will generate erroneously low results in samples for patients that have taken an overdose of Acetaminophen.    eGFR 116  ml/min/1.73sq m Final    Narrative:     National Kid[employer] Foundation guidelines for Chronic Kidney Disease (CKD):     Stage 1 with normal or high GFR (GFR > 90 mL/min/1.73 square meters)    Stage 2 Mild CKD (GFR = 60-89 mL/min/1.73 square meters)    Stage 3A Moderate CKD (GFR = 45-59 mL/min/1.73 square meters)    Stage 3B Moderate CKD (GFR = 30-44 mL/min/1.73 square meters)    Stage 4 Severe CKD (GFR = 15-29 mL/min/1.73 square meters)    Stage 5 End Stage CKD (GFR <15 mL/min/1.73 square meters)  Note: GFR calculation is accurate only with a steady state creatinine   HS  TROPONIN I 2HR   LIGHT BLUE TOP     XR chest 1 view portable   ED Interpretation   No acute cardiopulmonary abnormality, as interpreted by me.         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                   Follow-up Information       Follow up With Specialties Details Why Contact Info Additional Information     [facility] Emergency Department Emergency Medicine Call  If symptoms worsen [facility] [address]  [phone] [facility] Emergency [facility], [facility] [place], [place], [address]    [facility] Family Medicine Call in 1 day  [address] [place] [id number] [phone] [facility], [address] [place] [address], [id number] [phone]    Your Hematologist/Oncologist    Please follow up with your hematologist/oncologist for management of your known DVT.

## 2024-10-04 NOTE — Clinical Note
Abdoul Caballero was seen and treated in our emergency department on 10/4/2024.                Diagnosis:     Abdoul  may return to work on return date.    He may return on this date: 10/07/2024         If you have any questions or concerns, please don't hesitate to call.      Neil Steve V, DO    ______________________________           _______________          _______________  Hospital Representative                              Date                                Time

## 2024-10-05 LAB
ATRIAL RATE: 108 BPM
P AXIS: 38 DEGREES
PR INTERVAL: 144 MS
QRS AXIS: 31 DEGREES
QRSD INTERVAL: 96 MS
QT INTERVAL: 330 MS
QTC INTERVAL: 442 MS
T WAVE AXIS: 17 DEGREES
VENTRICULAR RATE: 108 BPM

## 2024-10-05 PROCEDURE — 93010 ELECTROCARDIOGRAM REPORT: CPT | Performed by: INTERNAL MEDICINE
